# Patient Record
Sex: MALE | Race: WHITE | Employment: OTHER | URBAN - METROPOLITAN AREA
[De-identification: names, ages, dates, MRNs, and addresses within clinical notes are randomized per-mention and may not be internally consistent; named-entity substitution may affect disease eponyms.]

---

## 2017-01-01 ENCOUNTER — HOME CARE VISIT (OUTPATIENT)
Dept: HOSPICE | Facility: HOSPICE | Age: 82
End: 2017-01-01
Payer: MEDICARE

## 2017-01-01 ENCOUNTER — PATIENT OUTREACH (OUTPATIENT)
Dept: CASE MANAGEMENT | Age: 82
End: 2017-01-01

## 2017-01-01 ENCOUNTER — HOME CARE VISIT (OUTPATIENT)
Dept: SCHEDULING | Facility: HOME HEALTH | Age: 82
End: 2017-01-01
Payer: MEDICARE

## 2017-01-01 ENCOUNTER — HOSPICE ADMISSION (OUTPATIENT)
Dept: HOSPICE | Facility: HOSPICE | Age: 82
End: 2017-01-01
Payer: MEDICARE

## 2017-01-01 ENCOUNTER — TELEPHONE (OUTPATIENT)
Dept: OTHER | Age: 82
End: 2017-01-01

## 2017-01-01 ENCOUNTER — APPOINTMENT (OUTPATIENT)
Dept: MRI IMAGING | Age: 82
DRG: 064 | End: 2017-01-01
Attending: INTERNAL MEDICINE
Payer: MEDICARE

## 2017-01-01 ENCOUNTER — APPOINTMENT (OUTPATIENT)
Dept: ULTRASOUND IMAGING | Age: 82
DRG: 064 | End: 2017-01-01
Attending: HOSPITALIST
Payer: MEDICARE

## 2017-01-01 ENCOUNTER — DOCUMENTATION ONLY (OUTPATIENT)
Dept: OTHER | Age: 82
End: 2017-01-01

## 2017-01-01 ENCOUNTER — HOSPITAL ENCOUNTER (INPATIENT)
Age: 82
LOS: 3 days | Discharge: HOSPICE | DRG: 064 | End: 2017-03-22
Attending: EMERGENCY MEDICINE | Admitting: INTERNAL MEDICINE
Payer: MEDICARE

## 2017-01-01 ENCOUNTER — APPOINTMENT (OUTPATIENT)
Dept: CT IMAGING | Age: 82
DRG: 064 | End: 2017-01-01
Attending: EMERGENCY MEDICINE
Payer: MEDICARE

## 2017-01-01 ENCOUNTER — APPOINTMENT (OUTPATIENT)
Dept: ULTRASOUND IMAGING | Age: 82
DRG: 064 | End: 2017-01-01
Attending: INTERNAL MEDICINE
Payer: MEDICARE

## 2017-01-01 VITALS
BODY MASS INDEX: 23.54 KG/M2 | OXYGEN SATURATION: 94 % | WEIGHT: 150 LBS | HEART RATE: 86 BPM | TEMPERATURE: 97.3 F | RESPIRATION RATE: 18 BRPM | DIASTOLIC BLOOD PRESSURE: 99 MMHG | SYSTOLIC BLOOD PRESSURE: 158 MMHG | HEIGHT: 67 IN

## 2017-01-01 VITALS
SYSTOLIC BLOOD PRESSURE: 130 MMHG | DIASTOLIC BLOOD PRESSURE: 80 MMHG | TEMPERATURE: 97.8 F | RESPIRATION RATE: 18 BRPM | HEART RATE: 104 BPM

## 2017-01-01 VITALS
HEART RATE: 104 BPM | DIASTOLIC BLOOD PRESSURE: 84 MMHG | TEMPERATURE: 98.1 F | RESPIRATION RATE: 20 BRPM | SYSTOLIC BLOOD PRESSURE: 140 MMHG

## 2017-01-01 VITALS
RESPIRATION RATE: 20 BRPM | DIASTOLIC BLOOD PRESSURE: 86 MMHG | SYSTOLIC BLOOD PRESSURE: 136 MMHG | TEMPERATURE: 97.7 F | HEART RATE: 77 BPM

## 2017-01-01 VITALS
TEMPERATURE: 98 F | DIASTOLIC BLOOD PRESSURE: 56 MMHG | SYSTOLIC BLOOD PRESSURE: 86 MMHG | HEART RATE: 128 BPM | RESPIRATION RATE: 20 BRPM

## 2017-01-01 VITALS
DIASTOLIC BLOOD PRESSURE: 72 MMHG | RESPIRATION RATE: 16 BRPM | HEART RATE: 104 BPM | SYSTOLIC BLOOD PRESSURE: 118 MMHG | TEMPERATURE: 98.1 F

## 2017-01-01 VITALS
RESPIRATION RATE: 18 BRPM | SYSTOLIC BLOOD PRESSURE: 100 MMHG | TEMPERATURE: 98.2 F | DIASTOLIC BLOOD PRESSURE: 60 MMHG | HEART RATE: 112 BPM

## 2017-01-01 VITALS
TEMPERATURE: 97.8 F | SYSTOLIC BLOOD PRESSURE: 110 MMHG | RESPIRATION RATE: 20 BRPM | HEART RATE: 116 BPM | DIASTOLIC BLOOD PRESSURE: 70 MMHG

## 2017-01-01 VITALS — DIASTOLIC BLOOD PRESSURE: 70 MMHG | RESPIRATION RATE: 18 BRPM | HEART RATE: 116 BPM | SYSTOLIC BLOOD PRESSURE: 112 MMHG

## 2017-01-01 VITALS
HEART RATE: 104 BPM | DIASTOLIC BLOOD PRESSURE: 70 MMHG | SYSTOLIC BLOOD PRESSURE: 110 MMHG | RESPIRATION RATE: 20 BRPM | TEMPERATURE: 97.8 F

## 2017-01-01 VITALS
RESPIRATION RATE: 16 BRPM | TEMPERATURE: 97.8 F | DIASTOLIC BLOOD PRESSURE: 60 MMHG | HEART RATE: 100 BPM | SYSTOLIC BLOOD PRESSURE: 100 MMHG

## 2017-01-01 VITALS
TEMPERATURE: 97.1 F | RESPIRATION RATE: 16 BRPM | HEART RATE: 72 BPM | DIASTOLIC BLOOD PRESSURE: 62 MMHG | SYSTOLIC BLOOD PRESSURE: 100 MMHG

## 2017-01-01 VITALS
SYSTOLIC BLOOD PRESSURE: 110 MMHG | RESPIRATION RATE: 18 BRPM | DIASTOLIC BLOOD PRESSURE: 80 MMHG | TEMPERATURE: 97.8 F | HEART RATE: 112 BPM

## 2017-01-01 VITALS
HEART RATE: 90 BPM | RESPIRATION RATE: 16 BRPM | DIASTOLIC BLOOD PRESSURE: 85 MMHG | SYSTOLIC BLOOD PRESSURE: 145 MMHG | TEMPERATURE: 97.8 F

## 2017-01-01 VITALS
SYSTOLIC BLOOD PRESSURE: 80 MMHG | TEMPERATURE: 98 F | DIASTOLIC BLOOD PRESSURE: 47 MMHG | HEART RATE: 110 BPM | RESPIRATION RATE: 16 BRPM

## 2017-01-01 VITALS
DIASTOLIC BLOOD PRESSURE: 88 MMHG | HEART RATE: 96 BPM | SYSTOLIC BLOOD PRESSURE: 125 MMHG | RESPIRATION RATE: 20 BRPM | TEMPERATURE: 97.9 F

## 2017-01-01 VITALS — HEART RATE: 104 BPM | SYSTOLIC BLOOD PRESSURE: 114 MMHG | RESPIRATION RATE: 20 BRPM | DIASTOLIC BLOOD PRESSURE: 76 MMHG

## 2017-01-01 VITALS
RESPIRATION RATE: 28 BRPM | HEART RATE: 108 BPM | SYSTOLIC BLOOD PRESSURE: 61 MMHG | RESPIRATION RATE: 20 BRPM | DIASTOLIC BLOOD PRESSURE: 34 MMHG | TEMPERATURE: 97.4 F | TEMPERATURE: 98.4 F | DIASTOLIC BLOOD PRESSURE: 60 MMHG | SYSTOLIC BLOOD PRESSURE: 80 MMHG | HEART RATE: 128 BPM

## 2017-01-01 VITALS
HEART RATE: 90 BPM | SYSTOLIC BLOOD PRESSURE: 89 MMHG | RESPIRATION RATE: 12 BRPM | DIASTOLIC BLOOD PRESSURE: 57 MMHG | TEMPERATURE: 97.9 F

## 2017-01-01 VITALS — RESPIRATION RATE: 16 BRPM | DIASTOLIC BLOOD PRESSURE: 77 MMHG | SYSTOLIC BLOOD PRESSURE: 127 MMHG | HEART RATE: 99 BPM

## 2017-01-01 VITALS — SYSTOLIC BLOOD PRESSURE: 119 MMHG | HEART RATE: 66 BPM | DIASTOLIC BLOOD PRESSURE: 58 MMHG

## 2017-01-01 VITALS
TEMPERATURE: 98.5 F | RESPIRATION RATE: 18 BRPM | DIASTOLIC BLOOD PRESSURE: 67 MMHG | SYSTOLIC BLOOD PRESSURE: 146 MMHG | HEART RATE: 108 BPM

## 2017-01-01 VITALS
DIASTOLIC BLOOD PRESSURE: 70 MMHG | TEMPERATURE: 97.6 F | RESPIRATION RATE: 18 BRPM | HEART RATE: 108 BPM | SYSTOLIC BLOOD PRESSURE: 100 MMHG

## 2017-01-01 VITALS — RESPIRATION RATE: 20 BRPM | SYSTOLIC BLOOD PRESSURE: 140 MMHG | DIASTOLIC BLOOD PRESSURE: 90 MMHG | HEART RATE: 84 BPM

## 2017-01-01 DIAGNOSIS — G45.9 TRANSIENT CEREBRAL ISCHEMIA, UNSPECIFIED TYPE: Primary | ICD-10-CM

## 2017-01-01 LAB
ALBUMIN SERPL BCP-MCNC: 3.3 G/DL (ref 3.2–4.6)
ALBUMIN/GLOB SERPL: 0.8 {RATIO} (ref 1.2–3.5)
ALP SERPL-CCNC: 110 U/L (ref 50–136)
ALT SERPL-CCNC: 8 U/L (ref 12–65)
ANION GAP BLD CALC-SCNC: 10 MMOL/L (ref 7–16)
ANION GAP BLD CALC-SCNC: 11 MMOL/L (ref 7–16)
ANION GAP BLD CALC-SCNC: 5 MMOL/L (ref 7–16)
ANION GAP BLD CALC-SCNC: 5 MMOL/L (ref 7–16)
APPEARANCE UR: ABNORMAL
AST SERPL W P-5'-P-CCNC: 18 U/L (ref 15–37)
ATRIAL RATE: 105 BPM
BACTERIA SPEC CULT: ABNORMAL
BACTERIA URNS QL MICRO: ABNORMAL /HPF
BASOPHILS # BLD AUTO: 0 K/UL (ref 0–0.2)
BASOPHILS # BLD: 0 % (ref 0–2)
BILIRUB SERPL-MCNC: 0.6 MG/DL (ref 0.2–1.1)
BILIRUB UR QL: NEGATIVE
BUN SERPL-MCNC: 17 MG/DL (ref 8–23)
BUN SERPL-MCNC: 20 MG/DL (ref 8–23)
BUN SERPL-MCNC: 21 MG/DL (ref 8–23)
BUN SERPL-MCNC: 22 MG/DL (ref 8–23)
CALCIUM SERPL-MCNC: 8.6 MG/DL (ref 8.3–10.4)
CALCIUM SERPL-MCNC: 8.8 MG/DL (ref 8.3–10.4)
CALCIUM SERPL-MCNC: 8.9 MG/DL (ref 8.3–10.4)
CALCIUM SERPL-MCNC: 9 MG/DL (ref 8.3–10.4)
CALCULATED P AXIS, ECG09: 0 DEGREES
CALCULATED R AXIS, ECG10: -76 DEGREES
CALCULATED T AXIS, ECG11: 79 DEGREES
CASTS URNS QL MICRO: ABNORMAL /LPF
CHLORIDE SERPL-SCNC: 104 MMOL/L (ref 98–107)
CHLORIDE SERPL-SCNC: 105 MMOL/L (ref 98–107)
CHLORIDE SERPL-SCNC: 107 MMOL/L (ref 98–107)
CHLORIDE SERPL-SCNC: 107 MMOL/L (ref 98–107)
CHOLEST SERPL-MCNC: 152 MG/DL
CO2 SERPL-SCNC: 25 MMOL/L (ref 21–32)
CO2 SERPL-SCNC: 27 MMOL/L (ref 21–32)
CO2 SERPL-SCNC: 29 MMOL/L (ref 21–32)
CO2 SERPL-SCNC: 31 MMOL/L (ref 21–32)
COLOR UR: YELLOW
CREAT SERPL-MCNC: 1.06 MG/DL (ref 0.8–1.5)
CREAT SERPL-MCNC: 1.08 MG/DL (ref 0.8–1.5)
CREAT SERPL-MCNC: 1.11 MG/DL (ref 0.8–1.5)
CREAT SERPL-MCNC: 1.26 MG/DL (ref 0.8–1.5)
DIAGNOSIS, 93000: NORMAL
DIASTOLIC BP, ECG02: NORMAL MMHG
DIFFERENTIAL METHOD BLD: ABNORMAL
EOSINOPHIL # BLD: 0.2 K/UL (ref 0–0.8)
EOSINOPHIL NFR BLD: 3 % (ref 0.5–7.8)
ERYTHROCYTE [DISTWIDTH] IN BLOOD BY AUTOMATED COUNT: 15.4 % (ref 11.9–14.6)
ERYTHROCYTE [DISTWIDTH] IN BLOOD BY AUTOMATED COUNT: 15.4 % (ref 11.9–14.6)
ERYTHROCYTE [DISTWIDTH] IN BLOOD BY AUTOMATED COUNT: 15.5 % (ref 11.9–14.6)
ERYTHROCYTE [DISTWIDTH] IN BLOOD BY AUTOMATED COUNT: 15.6 % (ref 11.9–14.6)
EST. AVERAGE GLUCOSE BLD GHB EST-MCNC: 126 MG/DL
GLOBULIN SER CALC-MCNC: 4.2 G/DL (ref 2.3–3.5)
GLUCOSE BLD STRIP.AUTO-MCNC: 100 MG/DL (ref 65–100)
GLUCOSE BLD STRIP.AUTO-MCNC: 106 MG/DL (ref 65–100)
GLUCOSE BLD STRIP.AUTO-MCNC: 111 MG/DL (ref 65–100)
GLUCOSE BLD STRIP.AUTO-MCNC: 121 MG/DL (ref 65–100)
GLUCOSE BLD STRIP.AUTO-MCNC: 134 MG/DL (ref 65–100)
GLUCOSE BLD STRIP.AUTO-MCNC: 82 MG/DL (ref 65–100)
GLUCOSE BLD STRIP.AUTO-MCNC: 83 MG/DL (ref 65–100)
GLUCOSE BLD STRIP.AUTO-MCNC: 86 MG/DL (ref 65–100)
GLUCOSE BLD STRIP.AUTO-MCNC: 89 MG/DL (ref 65–100)
GLUCOSE BLD STRIP.AUTO-MCNC: 93 MG/DL (ref 65–100)
GLUCOSE BLD STRIP.AUTO-MCNC: 93 MG/DL (ref 65–100)
GLUCOSE SERPL-MCNC: 107 MG/DL (ref 65–100)
GLUCOSE SERPL-MCNC: 76 MG/DL (ref 65–100)
GLUCOSE SERPL-MCNC: 84 MG/DL (ref 65–100)
GLUCOSE SERPL-MCNC: 86 MG/DL (ref 65–100)
GLUCOSE UR STRIP.AUTO-MCNC: NEGATIVE MG/DL
HBA1C MFR BLD: 6 % (ref 4.8–6)
HCT VFR BLD AUTO: 36.8 % (ref 41.1–50.3)
HCT VFR BLD AUTO: 37.3 % (ref 41.1–50.3)
HCT VFR BLD AUTO: 37.7 % (ref 41.1–50.3)
HCT VFR BLD AUTO: 41 % (ref 41.1–50.3)
HDLC SERPL-MCNC: 58 MG/DL (ref 40–60)
HDLC SERPL: 2.6 {RATIO}
HGB BLD-MCNC: 11.8 G/DL (ref 13.6–17.2)
HGB BLD-MCNC: 11.8 G/DL (ref 13.6–17.2)
HGB BLD-MCNC: 12.2 G/DL (ref 13.6–17.2)
HGB BLD-MCNC: 13.4 G/DL (ref 13.6–17.2)
HGB UR QL STRIP: NEGATIVE
IMM GRANULOCYTES # BLD: 0 K/UL (ref 0–0.5)
IMM GRANULOCYTES NFR BLD AUTO: 0.1 % (ref 0–5)
INR BLD: 1.1 (ref 0.9–1.2)
INR PPP: 1.1 (ref 0.9–1.2)
KETONES UR QL STRIP.AUTO: ABNORMAL MG/DL
LDLC SERPL CALC-MCNC: 83.4 MG/DL
LEUKOCYTE ESTERASE UR QL STRIP.AUTO: ABNORMAL
LIPID PROFILE,FLP: NORMAL
LYMPHOCYTES # BLD AUTO: 23 % (ref 13–44)
LYMPHOCYTES # BLD: 1.6 K/UL (ref 0.5–4.6)
MCH RBC QN AUTO: 27.6 PG (ref 26.1–32.9)
MCH RBC QN AUTO: 27.6 PG (ref 26.1–32.9)
MCH RBC QN AUTO: 28 PG (ref 26.1–32.9)
MCH RBC QN AUTO: 28.5 PG (ref 26.1–32.9)
MCHC RBC AUTO-ENTMCNC: 31.6 G/DL (ref 31.4–35)
MCHC RBC AUTO-ENTMCNC: 32.1 G/DL (ref 31.4–35)
MCHC RBC AUTO-ENTMCNC: 32.4 G/DL (ref 31.4–35)
MCHC RBC AUTO-ENTMCNC: 32.7 G/DL (ref 31.4–35)
MCV RBC AUTO: 86 FL (ref 79.6–97.8)
MCV RBC AUTO: 86.7 FL (ref 79.6–97.8)
MCV RBC AUTO: 87.2 FL (ref 79.6–97.8)
MCV RBC AUTO: 87.4 FL (ref 79.6–97.8)
MM INDURATION POC: NORMAL MM (ref 0–5)
MONOCYTES # BLD: 0.6 K/UL (ref 0.1–1.3)
MONOCYTES NFR BLD AUTO: 9 % (ref 4–12)
NEUTS SEG # BLD: 4.6 K/UL (ref 1.7–8.2)
NEUTS SEG NFR BLD AUTO: 65 % (ref 43–78)
NITRITE UR QL STRIP.AUTO: NEGATIVE
P-R INTERVAL, ECG05: 168 MS
PH UR STRIP: 6 [PH] (ref 5–9)
PLATELET # BLD AUTO: 248 K/UL (ref 150–450)
PLATELET # BLD AUTO: 249 K/UL (ref 150–450)
PLATELET # BLD AUTO: 249 K/UL (ref 150–450)
PLATELET # BLD AUTO: 265 K/UL (ref 150–450)
PMV BLD AUTO: 10 FL (ref 10.8–14.1)
PMV BLD AUTO: 10 FL (ref 10.8–14.1)
PMV BLD AUTO: 10.1 FL (ref 10.8–14.1)
PMV BLD AUTO: 10.3 FL (ref 10.8–14.1)
POTASSIUM SERPL-SCNC: 3.4 MMOL/L (ref 3.5–5.1)
POTASSIUM SERPL-SCNC: 3.6 MMOL/L (ref 3.5–5.1)
POTASSIUM SERPL-SCNC: 3.8 MMOL/L (ref 3.5–5.1)
POTASSIUM SERPL-SCNC: 4.6 MMOL/L (ref 3.5–5.1)
PPD POC: NORMAL NEGATIVE
PROT SERPL-MCNC: 7.5 G/DL (ref 6.3–8.2)
PROT UR STRIP-MCNC: NEGATIVE MG/DL
PROTHROMBIN TIME: 12.1 SEC (ref 9.6–12)
PT BLD: 13.6 SECS (ref 9.6–11.6)
Q-T INTERVAL, ECG07: 352 MS
QRS DURATION, ECG06: 110 MS
QTC CALCULATION (BEZET), ECG08: 462 MS
RBC # BLD AUTO: 4.27 M/UL (ref 4.23–5.67)
RBC # BLD AUTO: 4.28 M/UL (ref 4.23–5.67)
RBC # BLD AUTO: 4.35 M/UL (ref 4.23–5.67)
RBC # BLD AUTO: 4.7 M/UL (ref 4.23–5.67)
RBC #/AREA URNS HPF: ABNORMAL /HPF
SERVICE CMNT-IMP: ABNORMAL
SERVICE CMNT-IMP: ABNORMAL
SODIUM SERPL-SCNC: 139 MMOL/L (ref 136–145)
SODIUM SERPL-SCNC: 141 MMOL/L (ref 136–145)
SODIUM SERPL-SCNC: 143 MMOL/L (ref 136–145)
SODIUM SERPL-SCNC: 143 MMOL/L (ref 136–145)
SP GR UR REFRACTOMETRY: 1.02 (ref 1–1.02)
SYSTOLIC BP, ECG01: NORMAL MMHG
TRIGL SERPL-MCNC: 53 MG/DL (ref 35–150)
UROBILINOGEN UR QL STRIP.AUTO: 0.2 EU/DL (ref 0.2–1)
VENTRICULAR RATE, ECG03: 104 BPM
VLDLC SERPL CALC-MCNC: 10.6 MG/DL (ref 6–23)
WBC # BLD AUTO: 5.6 K/UL (ref 4.3–11.1)
WBC # BLD AUTO: 6.2 K/UL (ref 4.3–11.1)
WBC # BLD AUTO: 7 K/UL (ref 4.3–11.1)
WBC # BLD AUTO: 7 K/UL (ref 4.3–11.1)
WBC URNS QL MICRO: ABNORMAL /HPF

## 2017-01-01 PROCEDURE — 0651 HSPC ROUTINE HOME CARE

## 2017-01-01 PROCEDURE — G0299 HHS/HOSPICE OF RN EA 15 MIN: HCPCS

## 2017-01-01 PROCEDURE — 87086 URINE CULTURE/COLONY COUNT: CPT | Performed by: INTERNAL MEDICINE

## 2017-01-01 PROCEDURE — G0156 HHCP-SVS OF AIDE,EA 15 MIN: HCPCS

## 2017-01-01 PROCEDURE — 76770 US EXAM ABDO BACK WALL COMP: CPT

## 2017-01-01 PROCEDURE — 85027 COMPLETE CBC AUTOMATED: CPT | Performed by: INTERNAL MEDICINE

## 2017-01-01 PROCEDURE — 74011250637 HC RX REV CODE- 250/637: Performed by: INTERNAL MEDICINE

## 2017-01-01 PROCEDURE — G8996 SWALLOW CURRENT STATUS: HCPCS

## 2017-01-01 PROCEDURE — C8929 TTE W OR WO FOL WCON,DOPPLER: HCPCS

## 2017-01-01 PROCEDURE — 74011250636 HC RX REV CODE- 250/636: Performed by: INTERNAL MEDICINE

## 2017-01-01 PROCEDURE — 74011000302 HC RX REV CODE- 302: Performed by: INTERNAL MEDICINE

## 2017-01-01 PROCEDURE — HOSPICE MEDICATION HC HH HOSPICE MEDICATION

## 2017-01-01 PROCEDURE — 74011250637 HC RX REV CODE- 250/637: Performed by: HOSPITALIST

## 2017-01-01 PROCEDURE — 51798 US URINE CAPACITY MEASURE: CPT

## 2017-01-01 PROCEDURE — 99218 HC RM OBSERVATION: CPT

## 2017-01-01 PROCEDURE — 65660000000 HC RM CCU STEPDOWN

## 2017-01-01 PROCEDURE — 74011000250 HC RX REV CODE- 250: Performed by: INTERNAL MEDICINE

## 2017-01-01 PROCEDURE — 82962 GLUCOSE BLOOD TEST: CPT

## 2017-01-01 PROCEDURE — 36415 COLL VENOUS BLD VENIPUNCTURE: CPT | Performed by: INTERNAL MEDICINE

## 2017-01-01 PROCEDURE — BT40ZZZ ULTRASONOGRAPHY OF BLADDER: ICD-10-PCS | Performed by: INTERNAL MEDICINE

## 2017-01-01 PROCEDURE — 80053 COMPREHEN METABOLIC PANEL: CPT | Performed by: EMERGENCY MEDICINE

## 2017-01-01 PROCEDURE — G8997 SWALLOW GOAL STATUS: HCPCS

## 2017-01-01 PROCEDURE — G0155 HHCP-SVS OF CSW,EA 15 MIN: HCPCS

## 2017-01-01 PROCEDURE — 97166 OT EVAL MOD COMPLEX 45 MIN: CPT

## 2017-01-01 PROCEDURE — 87088 URINE BACTERIA CULTURE: CPT | Performed by: INTERNAL MEDICINE

## 2017-01-01 PROCEDURE — 85025 COMPLETE CBC W/AUTO DIFF WBC: CPT | Performed by: EMERGENCY MEDICINE

## 2017-01-01 PROCEDURE — 93880 EXTRACRANIAL BILAT STUDY: CPT

## 2017-01-01 PROCEDURE — T4541 LARGE DISPOSABLE UNDERPAD: HCPCS

## 2017-01-01 PROCEDURE — 87186 SC STD MICRODIL/AGAR DIL: CPT | Performed by: INTERNAL MEDICINE

## 2017-01-01 PROCEDURE — 74011000258 HC RX REV CODE- 258: Performed by: INTERNAL MEDICINE

## 2017-01-01 PROCEDURE — 85610 PROTHROMBIN TIME: CPT

## 2017-01-01 PROCEDURE — 85610 PROTHROMBIN TIME: CPT | Performed by: EMERGENCY MEDICINE

## 2017-01-01 PROCEDURE — T4523 ADULT SIZE BRIEF/DIAPER LG: HCPCS

## 2017-01-01 PROCEDURE — 77030011943

## 2017-01-01 PROCEDURE — 80048 BASIC METABOLIC PNL TOTAL CA: CPT | Performed by: INTERNAL MEDICINE

## 2017-01-01 PROCEDURE — 97167 OT EVAL HIGH COMPLEX 60 MIN: CPT

## 2017-01-01 PROCEDURE — 85027 COMPLETE CBC AUTOMATED: CPT | Performed by: HOSPITALIST

## 2017-01-01 PROCEDURE — G8987 SELF CARE CURRENT STATUS: HCPCS

## 2017-01-01 PROCEDURE — 99285 EMERGENCY DEPT VISIT HI MDM: CPT | Performed by: EMERGENCY MEDICINE

## 2017-01-01 PROCEDURE — 93005 ELECTROCARDIOGRAM TRACING: CPT | Performed by: EMERGENCY MEDICINE

## 2017-01-01 PROCEDURE — 80061 LIPID PANEL: CPT | Performed by: INTERNAL MEDICINE

## 2017-01-01 PROCEDURE — 87641 MR-STAPH DNA AMP PROBE: CPT | Performed by: INTERNAL MEDICINE

## 2017-01-01 PROCEDURE — 70551 MRI BRAIN STEM W/O DYE: CPT

## 2017-01-01 PROCEDURE — 86580 TB INTRADERMAL TEST: CPT | Performed by: INTERNAL MEDICINE

## 2017-01-01 PROCEDURE — 77010033678 HC OXYGEN DAILY

## 2017-01-01 PROCEDURE — 92610 EVALUATE SWALLOWING FUNCTION: CPT

## 2017-01-01 PROCEDURE — 83036 HEMOGLOBIN GLYCOSYLATED A1C: CPT | Performed by: INTERNAL MEDICINE

## 2017-01-01 PROCEDURE — 94760 N-INVAS EAR/PLS OXIMETRY 1: CPT

## 2017-01-01 PROCEDURE — 97161 PT EVAL LOW COMPLEX 20 MIN: CPT

## 2017-01-01 PROCEDURE — 70450 CT HEAD/BRAIN W/O DYE: CPT

## 2017-01-01 PROCEDURE — 65270000029 HC RM PRIVATE

## 2017-01-01 PROCEDURE — 3336500001 HSPC ELECTION

## 2017-01-01 PROCEDURE — 80048 BASIC METABOLIC PNL TOTAL CA: CPT | Performed by: HOSPITALIST

## 2017-01-01 PROCEDURE — 81003 URINALYSIS AUTO W/O SCOPE: CPT | Performed by: INTERNAL MEDICINE

## 2017-01-01 PROCEDURE — 36415 COLL VENOUS BLD VENIPUNCTURE: CPT | Performed by: HOSPITALIST

## 2017-01-01 PROCEDURE — G8988 SELF CARE GOAL STATUS: HCPCS

## 2017-01-01 RX ORDER — FINASTERIDE 5 MG/1
5 TABLET, FILM COATED ORAL DAILY
Status: DISCONTINUED | OUTPATIENT
Start: 2017-01-01 | End: 2017-01-01 | Stop reason: HOSPADM

## 2017-01-01 RX ORDER — TAMSULOSIN HYDROCHLORIDE 0.4 MG/1
0.4 CAPSULE ORAL DAILY
Status: DISCONTINUED | OUTPATIENT
Start: 2017-01-01 | End: 2017-01-01 | Stop reason: HOSPADM

## 2017-01-01 RX ORDER — SODIUM CHLORIDE 0.9 % (FLUSH) 0.9 %
5-10 SYRINGE (ML) INJECTION EVERY 8 HOURS
Status: DISCONTINUED | OUTPATIENT
Start: 2017-01-01 | End: 2017-01-01 | Stop reason: HOSPADM

## 2017-01-01 RX ORDER — MELATONIN
2000 2 TIMES DAILY
Status: DISCONTINUED | OUTPATIENT
Start: 2017-01-01 | End: 2017-01-01 | Stop reason: HOSPADM

## 2017-01-01 RX ORDER — FUROSEMIDE 20 MG/1
20 TABLET ORAL DAILY
Status: DISCONTINUED | OUTPATIENT
Start: 2017-01-01 | End: 2017-01-01 | Stop reason: HOSPADM

## 2017-01-01 RX ORDER — DIVALPROEX SODIUM 125 MG/1
125 CAPSULE, COATED PELLETS ORAL
Status: DISCONTINUED | OUTPATIENT
Start: 2017-01-01 | End: 2017-01-01 | Stop reason: HOSPADM

## 2017-01-01 RX ORDER — VANCOMYCIN HYDROCHLORIDE
1250 EVERY 24 HOURS
Status: DISCONTINUED | OUTPATIENT
Start: 2017-01-01 | End: 2017-01-01 | Stop reason: HOSPADM

## 2017-01-01 RX ORDER — GUAIFENESIN 100 MG/5ML
81 LIQUID (ML) ORAL DAILY
Status: DISCONTINUED | OUTPATIENT
Start: 2017-01-01 | End: 2017-01-01 | Stop reason: HOSPADM

## 2017-01-01 RX ORDER — DIVALPROEX SODIUM 125 MG/1
125 CAPSULE, COATED PELLETS ORAL
Qty: 90 CAP | Refills: 2 | Status: SHIPPED
Start: 2017-01-01 | End: 2017-01-01

## 2017-01-01 RX ORDER — HEPARIN SODIUM 5000 [USP'U]/ML
5000 INJECTION, SOLUTION INTRAVENOUS; SUBCUTANEOUS EVERY 8 HOURS
Status: DISCONTINUED | OUTPATIENT
Start: 2017-01-01 | End: 2017-01-01 | Stop reason: HOSPADM

## 2017-01-01 RX ORDER — ATORVASTATIN CALCIUM 40 MG/1
40 TABLET, FILM COATED ORAL
Status: DISCONTINUED | OUTPATIENT
Start: 2017-01-01 | End: 2017-01-01 | Stop reason: HOSPADM

## 2017-01-01 RX ORDER — SODIUM CHLORIDE 9 MG/ML
75 INJECTION, SOLUTION INTRAVENOUS CONTINUOUS
Status: DISCONTINUED | OUTPATIENT
Start: 2017-01-01 | End: 2017-01-01 | Stop reason: HOSPADM

## 2017-01-01 RX ORDER — VANCOMYCIN/0.9 % SOD CHLORIDE 1.5G/250ML
1500 PLASTIC BAG, INJECTION (ML) INTRAVENOUS EVERY 12 HOURS
Status: DISCONTINUED | OUTPATIENT
Start: 2017-01-01 | End: 2017-01-01 | Stop reason: DRUGHIGH

## 2017-01-01 RX ORDER — LISINOPRIL 5 MG/1
2.5 TABLET ORAL DAILY
Status: DISCONTINUED | OUTPATIENT
Start: 2017-01-01 | End: 2017-01-01 | Stop reason: HOSPADM

## 2017-01-01 RX ORDER — SODIUM CHLORIDE 0.9 % (FLUSH) 0.9 %
5-10 SYRINGE (ML) INJECTION AS NEEDED
Status: DISCONTINUED | OUTPATIENT
Start: 2017-01-01 | End: 2017-01-01 | Stop reason: HOSPADM

## 2017-01-01 RX ORDER — CARBIDOPA AND LEVODOPA 25; 100 MG/1; MG/1
1.5 TABLET ORAL 4 TIMES DAILY
Status: DISCONTINUED | OUTPATIENT
Start: 2017-01-01 | End: 2017-01-01 | Stop reason: HOSPADM

## 2017-01-01 RX ADMIN — CARBIDOPA AND LEVODOPA 1.5 TABLET: 25; 100 TABLET ORAL at 16:53

## 2017-01-01 RX ADMIN — SODIUM CHLORIDE 75 ML/HR: 900 INJECTION, SOLUTION INTRAVENOUS at 03:31

## 2017-01-01 RX ADMIN — FUROSEMIDE 20 MG: 20 TABLET ORAL at 14:59

## 2017-01-01 RX ADMIN — Medication 10 ML: at 23:18

## 2017-01-01 RX ADMIN — HEPARIN SODIUM 5000 UNITS: 5000 INJECTION, SOLUTION INTRAVENOUS; SUBCUTANEOUS at 13:37

## 2017-01-01 RX ADMIN — ATORVASTATIN CALCIUM 40 MG: 40 TABLET, FILM COATED ORAL at 22:27

## 2017-01-01 RX ADMIN — VANCOMYCIN HYDROCHLORIDE 1250 MG: 10 INJECTION, POWDER, LYOPHILIZED, FOR SOLUTION INTRAVENOUS at 10:00

## 2017-01-01 RX ADMIN — Medication 10 ML: at 20:51

## 2017-01-01 RX ADMIN — PERFLUTREN 1 ML: 6.52 INJECTION, SUSPENSION INTRAVENOUS at 09:00

## 2017-01-01 RX ADMIN — TUBERCULIN PURIFIED PROTEIN DERIVATIVE 5 UNITS: 5 INJECTION INTRADERMAL at 16:47

## 2017-01-01 RX ADMIN — HEPARIN SODIUM 5000 UNITS: 5000 INJECTION, SOLUTION INTRAVENOUS; SUBCUTANEOUS at 20:49

## 2017-01-01 RX ADMIN — SODIUM CHLORIDE 75 ML/HR: 900 INJECTION, SOLUTION INTRAVENOUS at 18:35

## 2017-01-01 RX ADMIN — CARBIDOPA AND LEVODOPA 1.5 TABLET: 25; 100 TABLET ORAL at 08:21

## 2017-01-01 RX ADMIN — HEPARIN SODIUM 5000 UNITS: 5000 INJECTION, SOLUTION INTRAVENOUS; SUBCUTANEOUS at 06:35

## 2017-01-01 RX ADMIN — HEPARIN SODIUM 5000 UNITS: 5000 INJECTION, SOLUTION INTRAVENOUS; SUBCUTANEOUS at 13:31

## 2017-01-01 RX ADMIN — Medication 10 ML: at 05:35

## 2017-01-01 RX ADMIN — Medication 10 ML: at 06:36

## 2017-01-01 RX ADMIN — LISINOPRIL 2.5 MG: 5 TABLET ORAL at 13:31

## 2017-01-01 RX ADMIN — Medication 5 ML: at 14:00

## 2017-01-01 RX ADMIN — FUROSEMIDE 20 MG: 20 TABLET ORAL at 08:22

## 2017-01-01 RX ADMIN — TAMSULOSIN HYDROCHLORIDE 0.4 MG: 0.4 CAPSULE ORAL at 11:55

## 2017-01-01 RX ADMIN — ASPIRIN 81 MG: 81 TABLET, CHEWABLE ORAL at 11:55

## 2017-01-01 RX ADMIN — FUROSEMIDE 20 MG: 20 TABLET ORAL at 11:55

## 2017-01-01 RX ADMIN — CARBIDOPA AND LEVODOPA 1.5 TABLET: 25; 100 TABLET ORAL at 09:03

## 2017-01-01 RX ADMIN — VITAMIN D, TAB 1000IU (100/BT) 2000 UNITS: 25 TAB at 11:56

## 2017-01-01 RX ADMIN — CARBIDOPA AND LEVODOPA 1.5 TABLET: 25; 100 TABLET ORAL at 17:10

## 2017-01-01 RX ADMIN — VITAMIN D, TAB 1000IU (100/BT) 2000 UNITS: 25 TAB at 17:10

## 2017-01-01 RX ADMIN — HEPARIN SODIUM 5000 UNITS: 5000 INJECTION, SOLUTION INTRAVENOUS; SUBCUTANEOUS at 23:18

## 2017-01-01 RX ADMIN — TAMSULOSIN HYDROCHLORIDE 0.4 MG: 0.4 CAPSULE ORAL at 09:03

## 2017-01-01 RX ADMIN — Medication 10 ML: at 05:08

## 2017-01-01 RX ADMIN — FINASTERIDE 5 MG: 5 TABLET, FILM COATED ORAL at 08:22

## 2017-01-01 RX ADMIN — CEFTRIAXONE 1 G: 1 INJECTION, POWDER, FOR SOLUTION INTRAMUSCULAR; INTRAVENOUS at 19:33

## 2017-01-01 RX ADMIN — VANCOMYCIN HYDROCHLORIDE 1250 MG: 10 INJECTION, POWDER, LYOPHILIZED, FOR SOLUTION INTRAVENOUS at 09:02

## 2017-01-01 RX ADMIN — CARBIDOPA AND LEVODOPA 1.5 TABLET: 25; 100 TABLET ORAL at 22:28

## 2017-01-01 RX ADMIN — FINASTERIDE 5 MG: 5 TABLET, FILM COATED ORAL at 09:03

## 2017-01-01 RX ADMIN — TAMSULOSIN HYDROCHLORIDE 0.4 MG: 0.4 CAPSULE ORAL at 14:59

## 2017-01-01 RX ADMIN — ASPIRIN 81 MG: 81 TABLET, CHEWABLE ORAL at 14:59

## 2017-01-01 RX ADMIN — VITAMIN D, TAB 1000IU (100/BT) 2000 UNITS: 25 TAB at 19:32

## 2017-01-01 RX ADMIN — ATORVASTATIN CALCIUM 40 MG: 40 TABLET, FILM COATED ORAL at 21:14

## 2017-01-01 RX ADMIN — CARBIDOPA AND LEVODOPA 1.5 TABLET: 25; 100 TABLET ORAL at 20:49

## 2017-01-01 RX ADMIN — HEPARIN SODIUM 5000 UNITS: 5000 INJECTION, SOLUTION INTRAVENOUS; SUBCUTANEOUS at 15:07

## 2017-01-01 RX ADMIN — HEPARIN SODIUM 5000 UNITS: 5000 INJECTION, SOLUTION INTRAVENOUS; SUBCUTANEOUS at 21:14

## 2017-01-01 RX ADMIN — TAMSULOSIN HYDROCHLORIDE 0.4 MG: 0.4 CAPSULE ORAL at 08:22

## 2017-01-01 RX ADMIN — CARBIDOPA AND LEVODOPA 1.5 TABLET: 25; 100 TABLET ORAL at 14:59

## 2017-01-01 RX ADMIN — FUROSEMIDE 20 MG: 20 TABLET ORAL at 09:03

## 2017-01-01 RX ADMIN — SODIUM CHLORIDE 75 ML/HR: 900 INJECTION, SOLUTION INTRAVENOUS at 07:56

## 2017-01-01 RX ADMIN — CARBIDOPA AND LEVODOPA 1.5 TABLET: 25; 100 TABLET ORAL at 11:55

## 2017-01-01 RX ADMIN — Medication 10 ML: at 22:31

## 2017-01-01 RX ADMIN — VITAMIN D, TAB 1000IU (100/BT) 2000 UNITS: 25 TAB at 16:53

## 2017-01-01 RX ADMIN — ASPIRIN 81 MG: 81 TABLET, CHEWABLE ORAL at 09:03

## 2017-01-01 RX ADMIN — HEPARIN SODIUM 5000 UNITS: 5000 INJECTION, SOLUTION INTRAVENOUS; SUBCUTANEOUS at 06:00

## 2017-01-01 RX ADMIN — HEPARIN SODIUM 5000 UNITS: 5000 INJECTION, SOLUTION INTRAVENOUS; SUBCUTANEOUS at 05:08

## 2017-01-01 RX ADMIN — ATORVASTATIN CALCIUM 40 MG: 40 TABLET, FILM COATED ORAL at 20:50

## 2017-01-01 RX ADMIN — VITAMIN D, TAB 1000IU (100/BT) 2000 UNITS: 25 TAB at 08:22

## 2017-01-01 RX ADMIN — CARBIDOPA AND LEVODOPA 1.5 TABLET: 25; 100 TABLET ORAL at 13:31

## 2017-01-01 RX ADMIN — HEPARIN SODIUM 5000 UNITS: 5000 INJECTION, SOLUTION INTRAVENOUS; SUBCUTANEOUS at 22:00

## 2017-01-01 RX ADMIN — CARBIDOPA AND LEVODOPA 1.5 TABLET: 25; 100 TABLET ORAL at 19:32

## 2017-01-01 RX ADMIN — ASPIRIN 81 MG: 81 TABLET, CHEWABLE ORAL at 08:21

## 2017-01-01 RX ADMIN — HEPARIN SODIUM 5000 UNITS: 5000 INJECTION, SOLUTION INTRAVENOUS; SUBCUTANEOUS at 05:35

## 2017-01-01 RX ADMIN — Medication 5 ML: at 06:00

## 2017-01-01 RX ADMIN — CARBIDOPA AND LEVODOPA 1.5 TABLET: 25; 100 TABLET ORAL at 21:14

## 2017-01-01 RX ADMIN — VITAMIN D, TAB 1000IU (100/BT) 2000 UNITS: 25 TAB at 09:03

## 2017-01-01 RX ADMIN — CARBIDOPA AND LEVODOPA 1.5 TABLET: 25; 100 TABLET ORAL at 13:37

## 2017-01-01 RX ADMIN — FINASTERIDE 5 MG: 5 TABLET, FILM COATED ORAL at 11:55

## 2017-01-01 RX ADMIN — FINASTERIDE 5 MG: 5 TABLET, FILM COATED ORAL at 14:59

## 2017-01-01 RX ADMIN — LISINOPRIL 2.5 MG: 5 TABLET ORAL at 11:56

## 2017-01-01 RX ADMIN — Medication 10 ML: at 21:14

## 2017-03-18 NOTE — ED PROVIDER NOTES
HPI Comments: Patient presents to the ER via EMS after family noted acute onset of right-sided weakness. Apparently patient has a history of dementia and doesn't speak much as well as has difficulty walking around. However family knows and around 1:30 PM he had acute onset of gazing towards his left side and right-sided weakness. EMS was called and patient was brought to the ER. Patient is a 80 y.o. male presenting with weakness. The history is provided by the EMS personnel. Extremity Weakness    This is a new problem. The current episode started 1 to 2 hours ago. The problem occurs constantly. The problem has not changed since onset. Pertinent negatives include no back pain. Past Medical History:   Diagnosis Date    BPH (benign prostatic hyperplasia) 2/2/2014    Cancer of the skin, basal cell     Dizziness 2/2/2014    Elevated PSA     GERD (gastroesophageal reflux disease)     IFG (impaired fasting glucose)     Lumbosacral spondylosis without myelopathy     OA (osteoarthritis)     Other ill-defined conditions(799.89)     Diverticula - BE 1999    Parkinson disease ([facility] [address]     Pure hypercholesterolemia     Sciatica     Spinal stenosis, lumbar region, without neurogenic claudication     TIA (transient ischemic attack) 2/2/2014    Unspecified hyperplasia of prostate without urinary obstruction and other lower urinary tract symptoms (LUTS)        Past Surgical History:   Procedure Laterality Date    HX CYST REMOVAL      Chest wall cyst removed    HX HERNIA REPAIR Bilateral     Inguinal         Family History:   Problem Relation Age of Onset    Alzheimer Mother     Other Father      Bowel obstruction    Stroke Brother     Heart Attack Brother        Social History     Social History    Marital status:      Spouse name: N/A    Number of children: N/A    Years of education: N/A     Occupational History    Not on file.      Social History Main Topics    Smoking status: Former Smoker    Smokeless tobacco: Not on file    Alcohol use No      Comment: 1 x per week    Drug use: Not on file    Sexual activity: Not on file     Other Topics Concern    Not on file     Social History Narrative         ALLERGIES: Review of patient's allergies indicates no known allergies. Review of Systems   Constitutional: Negative for fatigue, fever and unexpected weight change. Eyes: Negative for photophobia, redness and visual disturbance. Cardiovascular: Negative for palpitations and leg swelling. Gastrointestinal: Negative for nausea and vomiting. Endocrine: Negative for polydipsia, polyphagia and polyuria. Genitourinary: Negative for urgency. Musculoskeletal: Negative for back pain and gait problem. Skin: Negative for pallor and rash. Allergic/Immunologic: Negative for food allergies and immunocompromised state. Neurological: Positive for weakness. Psychiatric/Behavioral: Negative for behavioral problems. All other systems reviewed and are negative. Vitals:    03/18/17 1533   BP: 128/65   Pulse: (!) 102   Resp: 16   SpO2: 96%   Weight: 68 kg (150 lb)   Height: 5' 7\" (1.702 m)            Physical Exam   Constitutional: He appears well-developed and well-nourished. HENT:   Head: Normocephalic and atraumatic. Mouth/Throat: Oropharynx is clear and moist. No oropharyngeal exudate. Eyes: Conjunctivae and EOM are normal. Pupils are equal, round, and reactive to light. Neck: Normal range of motion. Neck supple. Cardiovascular: Normal rate and regular rhythm. Pulmonary/Chest: Effort normal and breath sounds normal.   Abdominal: Soft. Bowel sounds are normal.   Neurological: He is alert. Patient with lateralize left gaze, right upper and right lower extremity weakness noted   Skin: Skin is warm and dry. Nursing note and vitals reviewed.        MDM  Number of Diagnoses or Management Options  Diagnosis management comments: Concern for acute CVA, post stroke has been called  Neurology has been consulted    3:56 PM  Patient has had improvement of symptoms. He is able to follow commands and gaze is not fixed. He has been seen by neurology who at this time did not feel TPA is warranted. We'll admit to the hospital for further CVA workup       Amount and/or Complexity of Data Reviewed  Clinical lab tests: ordered and reviewed  Tests in the radiology section of CPT®: reviewed and ordered    Risk of Complications, Morbidity, and/or Mortality  Presenting problems: high  Diagnostic procedures: moderate  Management options: high    Patient Progress  Patient progress: stable    ED Course       Procedures    Results Include:    Recent Results (from the past 24 hour(s))   POC PT/INR    Collection Time: 03/18/17  3:31 PM   Result Value Ref Range    Prothrombin time (POC) 13.6 (H) 9.6 - 11.6 SECS    INR (POC) 1.1 0.9 - 1.2     CBC WITH AUTOMATED DIFF    Collection Time: 03/18/17  3:38 PM   Result Value Ref Range    WBC 7.0 4.3 - 11.1 K/uL    RBC 4.70 4.23 - 5.67 M/uL    HGB 13.4 (L) 13.6 - 17.2 g/dL    HCT 41.0 (L) 41.1 - 50.3 %    MCV 87.2 79.6 - 97.8 FL    MCH 28.5 26.1 - 32.9 PG    MCHC 32.7 31.4 - 35.0 g/dL    RDW 15.4 (H) 11.9 - 14.6 %    PLATELET 950 502 - 273 K/uL    MPV 10.0 (L) 10.8 - 14.1 FL    DF AUTOMATED      NEUTROPHILS 65 43 - 78 %    LYMPHOCYTES 23 13 - 44 %    MONOCYTES 9 4.0 - 12.0 %    EOSINOPHILS 3 0.5 - 7.8 %    BASOPHILS 0 0.0 - 2.0 %    IMMATURE GRANULOCYTES 0.1 0.0 - 5.0 %    ABS. NEUTROPHILS 4.6 1.7 - 8.2 K/UL    ABS. LYMPHOCYTES 1.6 0.5 - 4.6 K/UL    ABS. MONOCYTES 0.6 0.1 - 1.3 K/UL    ABS. EOSINOPHILS 0.2 0.0 - 0.8 K/UL    ABS. BASOPHILS 0.0 0.0 - 0.2 K/UL    ABS. IMM.  GRANS. 0.0 0.0 - 0.5 K/UL   GLUCOSE, POC    Collection Time: 03/18/17  3:40 PM   Result Value Ref Range    Glucose (POC) 106 (H) 65 - 100 mg/dL     CT HEAD WO CONT (Final result) Result time: 03/18/17 15:30:58     Final result by Paramjit Faulkner MD (03/18/17 15:30:58)     Impression:     IMPRESSION:  Negative for acute intracranial abnormality.  Chronic changes.         Narrative:     CT HEAD WITHOUT CONTRAST. INDICATION: Acute right-sided weakness. CVA suspected. Codes stroke. COMPARISON: 23 April 2016.      TECHNIQUE:   5 mm axial scans from the skull base to the vertex.  Our CT  scanners use one or more of the following:  Automated exposure control,  adjustment of the mA and or kV according to patient size, iterative  reconstruction. FINDINGS:  No acute intraparenchymal hemorrhage or abnormal extra-axial fluid  collection.  The ventricles are normal size. Moderate white matter low  attenuation is present, nonspecific, likely chronic small vessel disease.  No  midline shift or mass effect. Generalized atrophy.  Included portion of the  paranasal sinuses and orbits grossly unremarkable.

## 2017-03-18 NOTE — PROGRESS NOTES
TRANSFER - IN REPORT:    Verbal report received from VIRA Gresham(name) on [address] [place] [address]  being received from ER(unit) for routine progression of care      Report consisted of patients Situation, Background, Assessment and   Recommendations(SBAR). Information from the following report(s) SBAR, Kardex, ED Summary and MAR was reviewed with the receiving nurse. Opportunity for questions and clarification was provided. Assessment completed upon patients arrival to unit and care assumed.

## 2017-03-18 NOTE — H&P
HOSPITALIST H&P/CONSULT  NAME:  Renee Haynes   Age:  80 y.o.  :   1928   MRN:   [id number]  PCP: Trae Robertson MD  Consulting MD:  Treatment Team: Attending Provider: Bianca Fox DO; Primary Nurse: Joyce Senior  HPI:   Patient 44N with pmhx of BPH, Parkinson, expressive aphasia presenting from the \"\" SNF with report of acute onset of right sided facial droop, left gaze deviation and right sided weakness beginning at approx 1 pm day of arrival. Family at bedside delivers hx d/t pt condition. States similar symptoms actually occurred the first time 4 days prior (tuesday night) with right sided weakness and facial droop and seem to have been improved the following morning. Occurred again today and facility requested transport to hospital. Symptoms have been improving during hospital course and at time of my interview his right sided facial droop was only remaining symptoms. At baseline pt has expressive aphasia but full motor strength. Can often communicate with \"thumbs up/down\".   Hospitalist asked to admit for CVA/TIA eval.    Complete ROS done and is as stated in HPI or otherwise negative  Past Medical History:   Diagnosis Date    BPH (benign prostatic hyperplasia) 2014    Cancer of the skin, basal cell     Dizziness 2014    Elevated PSA     GERD (gastroesophageal reflux disease)     IFG (impaired fasting glucose)     Lumbosacral spondylosis without myelopathy     OA (osteoarthritis)     Other ill-defined conditions(799.89)     Diverticula - BE     Parkinson disease ([facility] [address]     Pure hypercholesterolemia     Sciatica     Spinal stenosis, lumbar region, without neurogenic claudication     TIA (transient ischemic attack) 2014    Unspecified hyperplasia of prostate without urinary obstruction and other lower urinary tract symptoms (LUTS)       Past Surgical History:   Procedure Laterality Date    HX CYST REMOVAL      Chest wall cyst removed    HX HERNIA REPAIR Bilateral     Inguinal      Prior to Admission Medications   Prescriptions Last Dose Informant Patient Reported? Taking? alfuzosin SR (UROXATRAL) 10 mg SR tablet   No No   Sig: Take 1 Tab by mouth daily. carbidopa-levodopa (SINEMET)  mg per tablet   No No   Sig: Take 1.5 Tabs by mouth four (4) times daily. cholecalciferol, vitamin D3, 2,000 unit tab   Yes No   Sig: Take 1 Tab by mouth two (2) times a day. finasteride (PROSCAR) 5 mg tablet   No No   Sig: Take 1 Tab by mouth daily. furosemide (LASIX) 20 mg tablet   No No   Sig: TAKE 1 TABLET BY MOUTH EVERY OTHER DAY      Facility-Administered Medications: None     No Known Allergies   Social History   Substance Use Topics    Smoking status: Former Smoker    Smokeless tobacco: Not on file    Alcohol use No      Comment: 1 x per week      Family History   Problem Relation Age of Onset    Alzheimer Mother     Other Father      Bowel obstruction    Stroke Brother     Heart Attack Brother       Objective:     Visit Vitals    /81    Pulse (!) 113    Resp 12    Ht 5' 7\" (1.702 m)    Wt 68 kg (150 lb)    SpO2 99%    BMI 23.49 kg/m2      No data recorded. Oxygen Therapy  O2 Sat (%): 99 % (03/18/17 1715)  Pulse via Oximetry: 110 beats per minute (03/18/17 1715)  O2 Device: Room air (03/18/17 1533)  Physical Exam:  General:    Alert, aphasic. Thin appearing. Follows commands  Head:   Normocephalic, without obvious abnormality, atraumatic. Nose:  Nares normal. No drainage or sinus tenderness. Lungs:   Clear to auscultation bilaterally. No Wheezing or Rhonchi. No rales. Heart:   Regular rate and rhythm,  no murmur, rub or gallop. Abdomen:   Soft, non-tender. Not distended. Bowel sounds normal.   Extremities: No cyanosis. No edema. No clubbing  Skin:     Texture, turgor normal. No rashes or lesions. Not Jaundiced  Neurologic: Right sided facial droop. RUE 4/5 motor, RLE 5/5 motor.   Left sided 5/5 U/Le's  Data Review:   Recent Results (from the past 24 hour(s))   POC PT/INR    Collection Time: 03/18/17  3:31 PM   Result Value Ref Range    Prothrombin time (POC) 13.6 (H) 9.6 - 11.6 SECS    INR (POC) 1.1 0.9 - 1.2     CBC WITH AUTOMATED DIFF    Collection Time: 03/18/17  3:38 PM   Result Value Ref Range    WBC 7.0 4.3 - 11.1 K/uL    RBC 4.70 4.23 - 5.67 M/uL    HGB 13.4 (L) 13.6 - 17.2 g/dL    HCT 41.0 (L) 41.1 - 50.3 %    MCV 87.2 79.6 - 97.8 FL    MCH 28.5 26.1 - 32.9 PG    MCHC 32.7 31.4 - 35.0 g/dL    RDW 15.4 (H) 11.9 - 14.6 %    PLATELET 412 832 - 678 K/uL    MPV 10.0 (L) 10.8 - 14.1 FL    DF AUTOMATED      NEUTROPHILS 65 43 - 78 %    LYMPHOCYTES 23 13 - 44 %    MONOCYTES 9 4.0 - 12.0 %    EOSINOPHILS 3 0.5 - 7.8 %    BASOPHILS 0 0.0 - 2.0 %    IMMATURE GRANULOCYTES 0.1 0.0 - 5.0 %    ABS. NEUTROPHILS 4.6 1.7 - 8.2 K/UL    ABS. LYMPHOCYTES 1.6 0.5 - 4.6 K/UL    ABS. MONOCYTES 0.6 0.1 - 1.3 K/UL    ABS. EOSINOPHILS 0.2 0.0 - 0.8 K/UL    ABS. BASOPHILS 0.0 0.0 - 0.2 K/UL    ABS. IMM. GRANS. 0.0 0.0 - 0.5 K/UL   METABOLIC PANEL, COMPREHENSIVE    Collection Time: 03/18/17  3:38 PM   Result Value Ref Range    Sodium 141 136 - 145 mmol/L    Potassium 4.6 3.5 - 5.1 mmol/L    Chloride 104 98 - 107 mmol/L    CO2 27 21 - 32 mmol/L    Anion gap 10 7 - 16 mmol/L    Glucose 107 (H) 65 - 100 mg/dL    BUN 22 8 - 23 MG/DL    Creatinine 1.26 0.8 - 1.5 MG/DL    GFR est AA >60 >60 ml/min/1.73m2    GFR est non-AA 57 (L) >60 ml/min/1.73m2    Calcium 8.6 8.3 - 10.4 MG/DL    Bilirubin, total 0.6 0.2 - 1.1 MG/DL    ALT (SGPT) 8 (L) 12 - 65 U/L    AST (SGOT) 18 15 - 37 U/L    Alk.  phosphatase 110 50 - 136 U/L    Protein, total 7.5 6.3 - 8.2 g/dL    Albumin 3.3 3.2 - 4.6 g/dL    Globulin 4.2 (H) 2.3 - 3.5 g/dL    A-G Ratio 0.8 (L) 1.2 - 3.5     PROTHROMBIN TIME + INR    Collection Time: 03/18/17  3:38 PM   Result Value Ref Range    Prothrombin time 12.1 (H) 9.6 - 12.0 sec    INR 1.1 0.9 - 1.2     EKG, 12 LEAD, INITIAL    Collection Time: 03/18/17  3:38 PM Result Value Ref Range    Systolic BP  mmHg    Diastolic BP  mmHg    Ventricular Rate 104 BPM    Atrial Rate 105 BPM    P-R Interval 168 ms    QRS Duration 110 ms    Q-T Interval 352 ms    QTC Calculation (Bezet) 462 ms    Calculated P Axis 0 degrees    Calculated R Axis -76 degrees    Calculated T Axis 79 degrees    Diagnosis       !! AGE AND GENDER SPECIFIC ECG ANALYSIS !! Sinus tachycardia with Premature atrial complexes  Left axis deviation  Septal infarct (cited on or before 28-JUN-2016)  Abnormal ECG  When compared with ECG of 28-JUN-2016 09:34,  Premature atrial complexes are now Present     GLUCOSE, POC    Collection Time: 03/18/17  3:40 PM   Result Value Ref Range    Glucose (POC) 106 (H) 65 - 100 mg/dL     Imaging Don Supa /Studies   Final result (Exam End: 3/18/2017  3:21 PM) Open        Study Result      CT HEAD WITHOUT CONTRAST.     INDICATION: Acute right-sided weakness. CVA suspected. Codes stroke.     COMPARISON: 23 April 2016.      TECHNIQUE: 5 mm axial scans from the skull base to the vertex. Our CT  scanners use one or more of the following: Automated exposure control,  adjustment of the mA and or kV according to patient size, iterative  reconstruction.     FINDINGS: No acute intraparenchymal hemorrhage or abnormal extra-axial fluid  collection. The ventricles are normal size. Moderate white matter low  attenuation is present, nonspecific, likely chronic small vessel disease. No  midline shift or mass effect. Generalized atrophy. Included portion of the  paranasal sinuses and orbits grossly unremarkable.     IMPRESSION  IMPRESSION: Negative for acute intracranial abnormality. 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 ASA/statin. Pt/ot/ppd/speech therapy  - Parkisons disease - w/ expressive aphasia at baseline.  Cont home meds  - BPH - cont home meds  - underweight - consider nutrition consult pending speech eval.     Code Status: DNR as d/w family    Anticipated discharge: hep sq    Signed By: Ronnie Chacon DO     March 18, 2017

## 2017-03-18 NOTE — ED TRIAGE NOTES
Patient last seen normal at 1325. Patient arrives in ED with gaze to the left. Patient unable to move right arm or leg. Can squeeze hand on command on left side.

## 2017-03-19 PROBLEM — I61.9 CVA (CEREBROVASCULAR ACCIDENT DUE TO INTRACEREBRAL HEMORRHAGE) (HCC): Status: ACTIVE | Noted: 2017-01-01

## 2017-03-19 NOTE — PROGRESS NOTES
Problem: Dysphagia (Adult)  Goal: *Acute Goals and Plan of Care (Insert Text)  STG: Patient will swallow pureed diet and thin liquids by cup with no overt signs or symptoms of aspiration. STG: Patient will swallow regular textures once dentures in place, if indicated. STG: Patient will participate in Modified Barium Swallow study x1, if indicated. LTG: Patient will tolerate least restrictive diet consistency without respiratory compromise by discharge. Observation patient  Speech language pathology: bedside swallow note: Initial Assessment    NAME/AGE/GENDER: José Keenan is a 80 y.o. male  DATE: 3/19/2017  PRIMARY DIAGNOSIS: tia       ICD-10: Treatment Diagnosis: Oropharyngeal dysphagia (R13.12)  INTERDISCIPLINARY COLLABORATION: Registered Nurse  PRECAUTIONS/ALLERGIES: Review of patient's allergies indicates no known allergies. ASSESSMENT:Based on the objective data described below, Mr. Fabiola Sierra presents with no verbalizations and no command following. No family present. Patient is edentulous. Timely swallow with good laryngeal excursion and clear to cervical auscultation for thin liquids by cup and straw, puree and pudding. Patient nonverbal but no cough after swallow. Increased mastication time with delayed swallow for mixed consistency with immediate cough after swallow. Cracker deferred. Patient had Modified Barium Swallow (MBS) study performed 11/10/16 with following results:    \"Based on the objective data described below, the patient presents with mild oropharyngeal dysphagia. Patient tolerated several trials of thin liquids by cup with no penetration/aspiration. Brief premature spillage to the pyriform sinuses with thin liquid trials. Trace pharyngeal residue with smaller boluses of thin liquids and mild-moderate residual when taking larger sips which patient does clear spontaneously with a delayed second swallow.  While taking serial swallows from the straw impaired timing resulted in aspiration with ineffective, delayed cough between swallows. Patient tolerated pudding, mixed, and regular textures without penetration/aspiration and with minimal pharyngeal residue only. Mild increased time for mastication required with the cracker. Recommend regular textures/thin liquids by cup only. No straws. Small bites/sips with slow rate. \"  At this time, recommend pureed diet and thin liquids by cup only. No straws. Crush meds in puree. Patient may benefit from further assessment of swallow via Modified Barium Swallow study pending medical course. Need to determined if patient wears dentures and preferably would have those in place prior to repeat MBS. Patient will benefit from skilled intervention to address the below impairments. ?????? ? ? This section established at most recent assessment??????????  PROBLEM LIST (Impairments causing functional limitations):  1. dysphagia  REHABILITATION POTENTIAL FOR STATED GOALS: Fair  PLAN OF CARE:   Patient will benefit from skilled intervention to address the following impairments. RECOMMENDATIONS AND PLANNED INTERVENTIONS (Benefits and precautions of therapy have been discussed with the patient.):  · PO:  Pureed  · Liquids:  regular thin  MEDICATIONS:  · Crushed in puree  COMPENSATORY STRATEGIES/MODIFICATIONS INCLUDING:  · No straws  · Fully upright at 90 degrees for all PO  OTHER RECOMMENDATIONS (including follow up treatment recommendations): · Family training/education  RECOMMENDED DIET MODIFICATIONS DISCUSSED WITH:  · Nursing  · Patient  FREQUENCY/DURATION: Continue to follow patient 2 times a week for until goals met. RECOMMENDED REHABILITATION/EQUIPMENT: (at time of discharge pending progress):   Continue Skilled Therapy. SUBJECTIVE:   Patient nonverbal and does not follow commands. No family present. History of Present Injury/Illness: Mr. Curtis Cabrera  has a past medical history of BPH (benign prostatic hyperplasia) (2/2/2014);  Cancer of the skin, basal cell; Dizziness (2/2/2014); Elevated PSA; GERD (gastroesophageal reflux disease); IFG (impaired fasting glucose); Lumbosacral spondylosis without myelopathy; OA (osteoarthritis); Other ill-defined conditions(799.89); Parkinson disease ([facility] [place] [address] Pure hypercholesterolemia; Sciatica; Spinal stenosis, lumbar region, without neurogenic claudication; TIA (transient ischemic attack) (2/2/2014); and Unspecified hyperplasia of prostate without urinary obstruction and other lower urinary tract symptoms (LUTS). He also  has a past surgical history that includes hernia repair (Bilateral) and cyst removal.   Present Symptoms: altered mental status   Pain Intensity 1: 0  Current Medications:   No current facility-administered medications on file prior to encounter. Current Outpatient Prescriptions on File Prior to Encounter   Medication Sig Dispense Refill    furosemide (LASIX) 20 mg tablet TAKE 1 TABLET BY MOUTH EVERY OTHER DAY 15 Tab 12    carbidopa-levodopa (SINEMET)  mg per tablet Take 1.5 Tabs by mouth four (4) times daily. 180 Tab 12    finasteride (PROSCAR) 5 mg tablet Take 1 Tab by mouth daily. 30 Tab 12    alfuzosin SR (UROXATRAL) 10 mg SR tablet Take 1 Tab by mouth daily. 30 Tab 12    cholecalciferol, vitamin D3, 2,000 unit tab Take 1 Tab by mouth two (2) times a day. Current Dietary Status:  Regular textures, thin liquids    Social History/Home Situation: [facility]      OBJECTIVE:   Respiratory Status:        CXR Results:none this admission  MRI/CT Results:CT negative; MRI pending    Cognitive and Communication Status:  Neurologic State: Alert  Orientation Level: Unable to verbalize  Cognition: No command following        Safety/Judgement: Not assessed    BEDSIDE SWALLOW EVALUATION  Oral Assessment:  Oral Assessment  Labial: Impaired coordination  Dentition: Edentulous  Oral Hygiene: adequate  Lingual: Incoordinated  P.O.  Trials:  Patient Position: upright in bed    The patient was given the following: Consistency Presented: Thin liquid; Solid;Puree;Pudding;Mixed consistency; Ice chips  How Presented: SLP-fed/presented;Cup/sip;Cup/gulp; Spoon;Straw;Successive swallows    ORAL PHASE:  Bolus Acceptance: No impairment  Bolus Formation/Control: Impaired  Propulsion: Delayed (# of seconds)  Type of Impairment: Delayed;Mastication  Oral Residue: None    PHARYNGEAL PHASE:  Initiation of Swallow: No impairment  Laryngeal Elevation: Functional  Aspiration Signs/Symptoms: Weak cough  Vocal Quality:  (nonverbal)           Pharyngeal Phase Characteristics: No impairment, issues, or problems     OTHER OBSERVATIONS:  Rate/bite size: Questionable   Endurance:  Questionable      Tool Used: Dysphagia Outcome and Severity Scale (MIKE)    Score Comments   Normal Diet  [] 7 With no strategies or extra time needed   Functional Swallow  [] 6 May have mild oral or pharyngeal delay       Mild Dysphagia    [] 5 Which may require one diet consistency restricted (those who demonstrate penetration which is entirely cleared on MBS would be included)   Mild-Moderate Dysphagia  [] 4 With 1-2 diet consistencies restricted       Moderate Dysphagia  [] 3 With 2 or more diet consistencies restricted       Moderately Severe Dysphagia  [x] 2 With partial PO strategies (trials with ST only)       Severe Dysphagia  [] 1 With inability to tolerate any PO safely          Score:  Initial: 3 Most Recent: X (Date: -- )   Interpretation of Tool: The Dysphagia Outcome and Severity Scale (MIKE) is a simple, easy-to-use, 7-point scale developed to systematically rate the functional severity of dysphagia based on objective assessment and make recommendations for diet level, independence level, and type of nutrition. Score 7 6 5 4 3 2 1   Modifier CH CI CJ CK CL CM CN   ?  Swallowing:     - CURRENT STATUS: CL - 60%-79% impaired, limited or restricted    - GOAL STATUS:  CJ - 20%-39% impaired, limited or restricted    - D/C STATUS:  ---------------To be determined---------------  [place] / Product Type: Medicare /     TREATMENT:    (In addition to Assessment/Re-Assessment sessions the following treatments were rendered)  Assessment/Reassessment only, no treatment provided today  _____________________________________________________________________________________________  Safety:   After treatment position/precautions:  · RN notified  · Upright in Bed  Treatment Assessment:    Progression/Medical Necessity:   · Patient is expected to demonstrate progress in swallow timeliness, swallow function and diet tolerance to improve swallow safety, work toward diet advancement and decrease aspiration risk. Compliance with Program/Exercises: Will assess as treatment progresses. Reason for Continuation of Services/Other Comments:  · Patient continues to require skilled intervention due to medical complications. Recommendations/Intent for next treatment session: \"Treatment next visit will focus on diet tolerance\".     Total Treatment Duration:  Time In: 0276  Time Out: [address], MA, CCC-SLP

## 2017-03-19 NOTE — PROGRESS NOTES
Dual skin assessment done by Idalmis Rivera, ana maría and Sera chandra RN. No skin abnormalities noted. Sacrum intact with no redness noted. PT given call light but is aphasic so was checked on frequently for needs.

## 2017-03-19 NOTE — PROGRESS NOTES
Problem: Self Care Deficits Care Plan (Adult)  Goal: *Acute Goals and Plan of Care (Insert Text)  1. Eber Soto will complete self-feeding task with supervision to improve independence with basic ADL. 100 Elma Solo will demonstrate ability to sit edge of bed x 25 minutes with CGA to complete therapeutic activity/ADL. 100 Hoalice Solo will complete stand pivot transfer to chair with moderate assistance to improve independence with functional transfers. 100 Hoalice Solo will complete standing for hygiene and clothing management with minimal assistance. 100 Elma Solo will follow simple one step commands 90% of session to improve independence with ADL. Timeframe: 7 visits       OCCUPATIONAL THERAPY: Initial Assessment and AM 3/19/2017  OBSERVATION: Hospital Day: 2  [place] / [place] / Product Type: Medicare /      NAME/AGE/GENDER: Eber Soto is a 80 y.o. male          PRIMARY DIAGNOSIS:  tia <principal problem not specified> <principal problem not specified>        ICD-10: Treatment Diagnosis:        · Generalized Muscle Weakness (M62.81)  · Other lack of cordination (R27.8)  · History of falling (Z91.81)  · Abnormal posture (R29.3)   Precautions/Allergies:         Review of patient's allergies indicates no known allergies. ASSESSMENT:      Mr. Susie Estrada presents to the hospital with the onset of R sided weakness/facial droop. Pt was supine in bed upon arrival with supportive daughter at bedside. Pt is alert and cooperative. Pt has hx of aphasia and typically communicates with thumbs up/thumbs down. Pt able to respond occasionally with this method but does need additional time. Pt also has Parkinson's Disease and lives in skilled nursing facility. Pt is wheelchair bound at baseline (due to hx of falls) but is able to stand with staff. Daughter also reports that he is able to feed himself.  Pt is a pleasant gentleman and was able to follow commands for MMT. Pt able to move both upper extremities with 4+/5 strength in the LUE and 4/5 strength in the R UE (within available ROM). Pt able to transfer to edge of bed with moderate assistance. Pt maintains static sitting balance without assistance and propping with his UEs. Pt stood with therapist while patient care assistant helped with brief change (daughter reports he is a heavy wetter). Pt needed maximal assistance to stand with therapist but did progress to moderate at times. Pt tolerated standing for several minutes. Pt stands with narrow base of support and tends to lean posteriorly on heels with therapist working on facilitating better base of support and weight shift. Pt had bowel movement while standing with therapist. Pt did appear somewhat fatigued afterwards. Pt's daughter is very pleased stating that just yesterday he could barely move his R side and that he has made a \"360 turn\". Pt's daughter reports that he is no longer being followed by a neurologist due to his previous MD retiring. She feels that his medications for PD could be better optimized. Pt returned back to supine in bed afterwards due to waiting to go down for MRI. Pt able to smile at therapist and patient care assistant at end of session. Pt does not appear to have any significant R sided weakness as compared to baseline at this time. Pt did needs some occasionally cueing to look to the R with activity. Pt may be slightly below baseline at this time and will continue to benefit from OT services to address stated goals and plan of care. This section established at most recent assessment   PROBLEM LIST (Impairments causing functional limitations):  1. Decreased Strength  2. Decreased ADL/Functional Activities  3. Decreased Transfer Abilities  4. Decreased Balance  5. Decreased Activity Tolerance  6. Decreased Flexibility/Joint Mobility  7.  Decreased Cognition    INTERVENTIONS PLANNED: (Benefits and precautions of occupational therapy have been discussed with the patient.)  1. Activities of daily living training  2. Adaptive equipment training  3. Balance training  4. Clothing management  5. Cognitive training  6. Neuromuscular re-eduation  7. Theraputic activity  8. Theraputic exercise      TREATMENT PLAN: Frequency/Duration: Follow patient 3 times per week to address above goals. Rehabilitation Potential For Stated Goals: GOOD      RECOMMENDED REHABILITATION/EQUIPMENT: (at time of discharge pending progress): Continue Skilled Therapy. OCCUPATIONAL PROFILE AND HISTORY:   History of Present Injury/Illness (Reason for Referral):  See H&P  Past Medical History/Comorbidities:   Mr. Ricardo Wells  has a past medical history of BPH (benign prostatic hyperplasia) (2/2/2014); Cancer of the skin, basal cell; Dizziness (2/2/2014); Elevated PSA; GERD (gastroesophageal reflux disease); IFG (impaired fasting glucose); Lumbosacral spondylosis without myelopathy; OA (osteoarthritis); Other ill-defined conditions(799.89); Parkinson disease ([facility] [address] Pure hypercholesterolemia; Sciatica; Spinal stenosis, lumbar region, without neurogenic claudication; TIA (transient ischemic attack) (2/2/2014); and Unspecified hyperplasia of prostate without urinary obstruction and other lower urinary tract symptoms (LUTS). Mr. Ricardo Wells  has a past surgical history that includes hernia repair (Bilateral) and cyst removal.  Social History/Living Environment:   Home Environment: Skilled nursing facility  Living Alone: No  Support Systems: Child(michelle), Skilled nursing facility  Patient Expects to be Discharged to[de-identified] [address] [place] [address] facility  Tub or Shower Type: Shower  Prior Level of Function/Work/Activity:  Pt was living in skilled nursing facility. Pt was able to feed himself. Staff was assisting him with bathing/dressing tasks.  Pt was using a walker some time ago according to daughter, but was having too many falls so now patient is in a wheelchair. He stands with stand for transfer and during toileting hygiene/clothing management. Pt has hx of aphasia but typically able to respond with thumbs up/thumbs down. Personal Factors:          Sex:  male        Age:  80 y.o. Social Background:  Pt lives in skilled nursing facility and needs a fair amount of assistance with basic ADL. Other factors that influence how disability is experienced by the patient:  Pt is aphasic with hx of Parkinson's Disease    Number of Personal Factors/Comorbidities that affect the Plan of Care: Extensive review of physical, cognitive, and psychosocial performance (3+):  HIGH COMPLEXITY   ASSESSMENT OF OCCUPATIONAL PERFORMANCE[de-identified]   Activities of Daily Living:           Basic ADLs (From Assessment) Complex ADLs (From Assessment)   Basic ADL  Feeding: Moderate assistance  Oral Facial Hygiene/Grooming: Maximum assistance  Bathing: Maximum assistance  Upper Body Dressing: Total assistance  Lower Body Dressing: Total assistance  Toileting: Total assistance Instrumental ADL  Meal Preparation: Total assistance  Homemaking: Total assistance   Grooming/Bathing/Dressing Activities of Daily Living     Cognitive Retraining  Safety/Judgement: Fall prevention                 Functional Transfers  Toilet Transfer : Maximum assistance  Tub Transfer: Total assistance  Shower Transfer: Total assistance     Bed/Mat Mobility  Rolling: Moderate assistance  Supine to Sit: Moderate assistance  Sit to Supine: Moderate assistance  Sit to Stand: Moderate assistance  Bed to Chair: Maximum assistance  Scooting: Moderate assistance          Most Recent Physical Functioning:   Gross Assessment:                  Posture:     Balance:  Sitting: Impaired  Sitting - Static: Prop sitting  Sitting - Dynamic: Fair (occasional)  Standing: Impaired  Standing - Static: Poor Bed Mobility:  Rolling: Moderate assistance  Supine to Sit: Moderate assistance  Sit to Supine:  Moderate assistance  Scooting: Moderate assistance  Wheelchair Mobility:     Transfers:  Sit to Stand: Moderate assistance  Stand to Sit: Moderate assistance  Bed to Chair: Maximum assistance              Patient Vitals for the past 6 hrs:       BP SpO2 Pulse   17 0902 148/88 96 % 100   17 1105 150/89 100 % 90        Mental Status  Neurologic State: Alert  Orientation Level: Unable to verbalize  Cognition: Decreased command following  Perception: Cues to maintain midline in sitting, Cues to maintain midline in standing  Perseveration: No perseveration noted  Safety/Judgement: Fall prevention                               Physical Skills Involved:  1. Range of Motion  2. Balance  3. Mobility  4. Strength  5. Endurance  6. Fine or Gross Motor Coordination Cognitive Skills Affected (resulting in the inability to perform in a timely and safe manner):  1. Attending  2. Understanding  3. Problem Solving  4. Mental Sequencing  5. Remembering Psychosocial Skills Affected:  1. Routines and Behaviors   Number of elements that affect the Plan of Care: 5+:  HIGH COMPLEXITY   CLINICAL DECISION MAKIN[address] [place] [id number] AM-PAC 6 Clicks   Basic Mobility Inpatient Short Form  How much help from another person does the patient currently need. .. Total A Lot A Little None   1. Putting on and taking off regular lower body clothing? [X] 1   [ ] 2   [ ] 3   [ ] 4   2. Bathing (including washing, rinsing, drying)? [ ] 1   [X] 2   [ ] 3   [ ] 4   3. Toileting, which includes using toilet, bedpan or urinal?   [X] 1   [ ] 2   [ ] 3   [ ] 4   4. Putting on and taking off regular upper body clothing?   [ ] 1   [X] 2   [ ] 3   [ ] 4   5. Taking care of personal grooming such as brushing teeth? [ ] 1   [X] 2   [ ] 3   [ ] 4   6. Eating meals? [ ] 1   [X] 2   [ ] 3   [ ] 4   © , Trustees of [address] [place] [address] [address] [id number], under license to [employer].  All rights reserved    Score:  Initial: 10 Most Recent: X (Date: -- ) Interpretation of Tool:  Represents activities that are increasingly more difficult (i.e. Bed mobility, Transfers, Gait). Score 24 23 22-20 19-15 14-10 9-7 6       Modifier CH CI CJ CK CL CM CN         · Self Care:               - CURRENT STATUS:    CL - 60%-79% impaired, limited or restricted               - GOAL STATUS:           CK - 40%-59% impaired, limited or restricted               - D/C STATUS:                       ---------------To be determined---------------  [place] / Product Type: Medicare /       Medical Necessity:     · Patient demonstrates good and fair rehab potential due to higher previous functional level. Reason for Services/Other Comments:  · Patient continues to require skilled intervention due to decreased independence with ADL/functional transfers. Use of outcome tool(s) and clinical judgement create a POC that gives a: HIGH COMPLEXITY             TREATMENT:   (In addition to Assessment/Re-Assessment sessions the following treatments were rendered)      Pre-treatment Symptoms/Complaints:  Nods \"no\" to having pain  Pain: Initial:   Pain Intensity 1: 0  Post Session:  0/10      Assessment/Reassessment only, no treatment provided today     Braces/Orthotics/Lines/Etc:   · O2 Nasal Cannula  Treatment/Session Assessment:    · Response to Treatment:  Evaluation only. · Interdisciplinary Collaboration:  · Occupational Therapist  · Registered Nurse  · Certified Nursing Assistant/Patient Care Technician  · After treatment position/precautions:  · Supine in bed  · Bed/Chair-wheels locked  · Call light within reach  · RN notified  · Family at bedside  · Compliance with Program/Exercises: Will assess as treatment progresses. · Recommendations/Intent for next treatment session: \"Next visit will focus on advancements to more challenging activities and reduction in assistance provided\".   Total Treatment Duration:  OT Patient Time In/Time Out  Time In: 1100  Time Out: [address], OT

## 2017-03-19 NOTE — DISCHARGE INSTRUCTIONS

## 2017-03-20 PROBLEM — I63.9 ACUTE CVA (CEREBROVASCULAR ACCIDENT) (HCC): Status: ACTIVE | Noted: 2017-01-01

## 2017-03-20 NOTE — PROGRESS NOTES
Dr. Antoinette Núñez made aware of gpc in urine culture. Currently on rocephin.  Afebrile at this time

## 2017-03-20 NOTE — HOSPICE
[facility]-    Met with daughter in family room and then also talked with pt about hospice services/support. Discussed hospice philosphy of care, care at home, care team members and freq of visits. Also discussed the [facility] for respite and symptom management  Parmjit Cutler tearful at times but states she wants her father to have support and she wants him to remain at [facility]. Dr Israel Cohn called and approved for routine hospice care at the [facility]. Gely Fuller CM and Dr Martin Bartholomew made aware. The plan-  1.  DC 3/22 0900 via ambulance to [facility]  2. STEFAN Rn to admit into hospice services at 1030  3.   No DME needs at this time (pt has WC, BSC, shower bench) and daughter prefers pt to remain in his own bed    Thank you-  Oliver Park RN  [facility] liaison  [phone]

## 2017-03-20 NOTE — PROGRESS NOTES
Speech Pathology  ST attempted. However, pt getting a bath. Will continue to follow. Thank you.     Masha Ansari, [facility], [facility] JACKELIN DAVINA LEAL, CCC-SLP

## 2017-03-20 NOTE — PROGRESS NOTES
Care Management Interventions  PCP Verified by CM: Yes  Transition of Care Consult (CM Consult): Discharge Planning, Assisted Living  Discharge Durable Medical Equipment: No  Physical Therapy Consult: Yes  Occupational Therapy Consult: Yes  Current Support Network: Assisted Living  Confirm Follow Up Transport: Family  Plan discussed with Pt/Family/Caregiver: Yes  Freedom of Choice Offered: Yes    SW met with patient/ daughter this date. Daughter/ Alejandro Medina requested to talk in family room. Daughter would like contact information updated for Cell number [phone]/ Landline [phone] Qsot [phone]. Daughter is very tearful today. She stated she is overwhelmed. She is the only child, her mother  several years ago after a long charles with Alzheimer's. Daughter works full time and is primary caregiver to her 9year old granddaughter. Daughter affirmed she does have some support w a boyfriend and supportive friends. Pt had been living at the [facility] for the past 2 years. SW discussed various options w daughter including STR in a SNF. Daughter stated she would like if possible for her dad to return to the [facility] with Hospice if appropriate. Daughter stated that father \"does not want to be a burden\" and she feels like \"he just wants to die\"   Daughter expressed she wants to support her father's wishes, stated he was happy at [facility]. Daughter had questions about observation status- SW advised that pt was observation status and has since been switched to Inpatient. Letter will be provided for additional questions/ answer and support. Copy of Signed SEPULVEDA Letter on chart - copy to dtr and pt    Daughter provided with this writer's contact information. [facility] will be calling to meet with daughter/ patient this date.

## 2017-03-20 NOTE — PROGRESS NOTES
Pharmacokinetic Consult to Pharmacist    Gary Mack is a 80 y.o. male being treated for UTI with vancomycin and ceftriaxone. Patient has a history of UTI due to enterococcus. Height: 5' 7\" (170.2 cm)  Weight: 68 kg (150 lb)  Lab Results   Component Value Date/Time    BUN 20 03/20/2017 06:37 AM    Creatinine 1.11 03/20/2017 06:37 AM    WBC 5.6 03/20/2017 06:37 AM    Procalcitonin 1.0 06/28/2016 09:42 AM      Estimated Creatinine Clearance: 42.2 mL/min (based on Cr of 1.11). CULTURES:  3/18  Urine Cx: GPC, prelim    Day 1 of vancomycin. Goal trough is 12-18 for UTI. Vancomycin dose initiated at 1250 mg IV q24h. Plan to check a steady state level as indicated. Pharmacy will continue to follow. Please call with any questions.     Thank you,  Juan Hong, PharmD  Clinical Pharmacist  [phone]

## 2017-03-20 NOTE — PROGRESS NOTES
Hospitalist Progress Note    3/20/2017  Admit Date: 3/18/2017  3:02 PM   NAME: Awilda Callahan   :  1928   MRN:  [id number]   Attending: Bisi Cameron DO  PCP:  Asmita Lucas MD    SUBJECTIVE:    'Patient 89K with pmhx of BPH, Parkinson, expressive aphasia presenting from the \"\" SNF with report of acute onset of right sided facial droop, left gaze deviation and right sided weakness. At baseline pt has expressive aphasia but full motor strength. Can often communicate with \"thumbs up/down\". Hospitalist asked to admit for CVA/TIA eval.  MRI confirms right basal ganglia infarct. Carotid duplex w/o significant stenosis. Echocardiogram pending'     3-19 - pt aphasic. Does do \"thumbs up\" when asked if feeling ok, denies pain. Neuro deficits seemingly at baseline from before yesterday. 3/20/17- Seen with daughter, Lisa Vasquez at bedside. She states he is not as interactive as yesterday. Urine culture growing GPC and only on Rocephin currently. Will add vanc for now. He is afebrile. He shakes his head 'no' to pain. Follows command of giving a 'thumbs up.'  Re-discussed code status as he is currently listed as full code and his daughter broke into tears stating he does not want resuscitation and does not want to continue to live in the state he is in for prolonged time. She has thought about hospice in the past and was considering having evaluation at the [facility]. She would like to talk to hospice while here.         Review of Systems negative with exception of pertinent positives noted above  PHYSICAL EXAM     Visit Vitals    /77 (BP 1 Location: Left arm, BP Patient Position: At rest)    Pulse 69    Temp 97.4 °F (36.3 °C)    Resp 19    Ht 5' 7\" (1.702 m)    Wt 68 kg (150 lb)    SpO2 95%    BMI 23.49 kg/m2      Temp (24hrs), Av °F (36.7 °C), Min:96.7 °F (35.9 °C), Max:99.1 °F (37.3 °C)    Oxygen Therapy  O2 Sat (%): 95 % (17 0859)  Pulse via Oximetry: 110 beats per minute (03/18/17 1715)  O2 Device: Nasal cannula (03/20/17 0859)  O2 Flow Rate (L/min): 2 l/min (decreased to 1) (03/20/17 0859)    Intake/Output Summary (Last 24 hours) at 03/20/17 0904  Last data filed at 03/19/17 1821   Gross per 24 hour   Intake              480 ml   Output                0 ml   Net              480 ml      General: No acute distress, groggy initially then woke up and followed simple commands   Lungs:  CTA Bilaterally. Heart:  Regular rate and rhythm,  No murmur, rub, or gallop  Abdomen: Soft, Non distended, Non tender, Positive bowel sounds  Extremities: No cyanosis, clubbing or edema  Neurologic:  Aphasic    Recent Results (from the past 24 hour(s))   METABOLIC PANEL, BASIC    Collection Time: 03/20/17  6:37 AM   Result Value Ref Range    Sodium 139 136 - 145 mmol/L    Potassium 3.8 3.5 - 5.1 mmol/L    Chloride 105 98 - 107 mmol/L    CO2 29 21 - 32 mmol/L    Anion gap 5 (L) 7 - 16 mmol/L    Glucose 84 65 - 100 mg/dL    BUN 20 8 - 23 MG/DL    Creatinine 1.11 0.8 - 1.5 MG/DL    GFR est AA >60 >60 ml/min/1.73m2    GFR est non-AA >60 >60 ml/min/1.73m2    Calcium 9.0 8.3 - 10.4 MG/DL   CBC W/O DIFF    Collection Time: 03/20/17  6:37 AM   Result Value Ref Range    WBC 5.6 4.3 - 11.1 K/uL    RBC 4.27 4.23 - 5.67 M/uL    HGB 11.8 (L) 13.6 - 17.2 g/dL    HCT 37.3 (L) 41.1 - 50.3 %    MCV 87.4 79.6 - 97.8 FL    MCH 27.6 26.1 - 32.9 PG    MCHC 31.6 31.4 - 35.0 g/dL    RDW 15.6 (H) 11.9 - 14.6 %    PLATELET 312 507 - 715 K/uL    MPV 10.1 (L) 10.8 - 14.1 FL   GLUCOSE, POC    Collection Time: 03/20/17  7:25 AM   Result Value Ref Range    Glucose (POC) 82 65 - 100 mg/dL     MRI BRAIN WO CONT   Final Result   IMPRESSION: Acute/subacute small right basal ganglia infarct. No acute   hemorrhage. Generalized atrophy and chronic small vessel disease. DUPLEX CAROTID BILATERAL   Final Result   IMPRESSION:     PREETI:  No hemodynamically significant stenosis. LICA:  No hemodynamically significant stenosis. Tortuous PREETI consistent with long-standing hypertension. Relatively modest   atherosclerotic disease in the bilateral carotid arteries. CT HEAD WO CONT   Final Result   IMPRESSION:  Negative for acute intracranial abnormality. Chronic changes. ASSESSMENT      Active Hospital Problems    Diagnosis Date Noted    CVA (cerebrovascular accident due to intracerebral hemorrhage) ([facility] [address] 03/19/2017    Weakness due to cerebrovascular accident (CVA) ([address] 03/18/2017    Expressive speech disorder 04/14/2016    Parkinson disease ([facility] [address]     BPH (benign prostatic hyperplasia) 02/02/2014     Plan:  · GPC UTI- add on vanc and continue rocephin for now. · R basal ganglia CVA- strength getting back to baseline. On pureed diet. Speech following. · Advanced Parkinson's disease- consult hospice to help with care and to transition back to [facility] on hospice. Aspirin/statin. · Given multiple co-morbidities including acute CVA and GPC UTI requiring IV antibiotics, will transition him to inpatient status. · After long discussion with Antoinette Cortez, Mr. Leger's daughter, his code status changed to DNR. DVT Prophylaxis: Heparin. Signed By: Sonja Fishman MD     March 20, 2017

## 2017-03-20 NOTE — PROGRESS NOTES
Problem: Mobility Impaired (Adult and Pediatric)  Goal: *Acute Goals and Plan of Care (Insert Text)  LTG:  (1.)Mr. Anand Martinez will move from supine to sit and sit to supine , scoot up and down and roll side to side in bed with MINIMAL ASSIST within 7 day(s). (2.)Mr. Anand Martinez will transfer from bed to chair and chair to bed with MINIMAL ASSIST using the least restrictive device within 7 day(s). (3.)Mr. Leger will ambulate with MINIMAL ASSIST for 10+ feet with the least restrictive device within 7 day(s). (4.)Mr. Leger will improve standing balance to FAIR with least restrictive device within 7 days. ________________________________________________________________________________________________      PHYSICAL THERAPY: INITIAL ASSESSMENT, AM 3/20/2017  INPATIENT: Hospital Day: 3  Payor: /      NAME/AGE/GENDER: Cullen Cooper is a 80 y.o. male          PRIMARY DIAGNOSIS: tia  CVA (cerebrovascular accident due to intracerebral hemorrhage) ([facility] [address]  Acute CVA (cerebrovascular accident) ([facility] [address] CVA (cerebrovascular accident due to intracerebral hemorrhage) ([facility] [address] CVA (cerebrovascular accident due to intracerebral hemorrhage) ([facility] [address]        ICD-10: Treatment Diagnosis:       · Generalized Muscle Weakness (M62.81)  · Other lack of cordination (R27.8)  · Difficulty in walking, Not elsewhere classified (R26.2)  · Other abnormalities of gait and mobility (R26.89)   Precaution/Allergies:  Review of patient's allergies indicates no known allergies. ASSESSMENT:      Mr. Anand Matrinez presents to physical therapy after admission for suspected TIA/CVA with R sided weakness and decreased attention to R. He is supine upon arrival, daughter present. At baseline, pt is not verbal with hx of PD, CVA, dementia. He lives at the [facility] and is WC dependent. Typically communicates with thumbs up/down. Initially, he was able to follow some commands but was not communicating.  Attends to L side in supine and occasionally to R. Initiates movement but requires MOD A to roll L/R with brief change. Performs bed mobility with MAX A but is able to maintain sitting balance with BUE support. Increased alertness and attention to R in sitting. O2 100% on 1L; removed. Stands with MOD A x2 and takes a few steps towards chair with MAX A x2. Seated in chair with daughter requesting posey pad. At this time, daughter and pt in discussion with hospice RN. Alerted primary RN that pt is seated in chair and posey pad has not yet been applied but is available in the room. Mr. Hali Lundy will benefit from skilled PT intervention during acute care stay to improve bed mobility, transfers, and balance. Expecting to D/C to [address] [place] [address] with hospice. This section established at most recent assessment   PROBLEM LIST (Impairments causing functional limitations):  1. Decreased Strength  2. Decreased ADL/Functional Activities  3. Decreased Transfer Abilities  4. Decreased Ambulation Ability/Technique  5. Decreased Balance    INTERVENTIONS PLANNED: (Benefits and precautions of physical therapy have been discussed with the patient.)  1. Balance Exercise  2. Bed Mobility  3. Family Education  4. Gait Training  5. Neuromuscular Re-education/Strengthening  6. Therapeutic Activites  7. Therapeutic Exercise/Strengthening  8. Transfer Training      TREATMENT PLAN: Frequency/Duration: 2 times a week for duration of hospital stay  Rehabilitation Potential For Stated Goals: Sukumar Adair REHABILITATION/EQUIPMENT: (at time of discharge pending progress): Return to [facility] with hospice.                    HISTORY:   History of Present Injury/Illness (Reason for Referral):  Per H&P, \"Patient 89M with pmhx of BPH, Parkinson, expressive aphasia presenting from the \"[facility] SNF with report of acute onset of right sided facial droop, left gaze deviation and right sided weakness beginning at approx 1 pm day of arrival. Family at bedside delivers hx d/t pt condition. States similar symptoms actually occurred the first time 4 days prior (tuesday night) with right sided weakness and facial droop and seem to have been improved the following morning. Occurred again today and facility requested transport to hospital. Symptoms have been improving during hospital course and at time of my interview his right sided facial droop was only remaining symptoms. At baseline pt has expressive aphasia but full motor strength. Can often communicate with \"thumbs up/down\". Hospitalist asked to admit for CVA/TIA eval.\"     Past Medical History/Comorbidities:   Mr. Dilcia Coleman  has a past medical history of BPH (benign prostatic hyperplasia) (2/2/2014); Cancer of the skin, basal cell; Dizziness (2/2/2014); Elevated PSA; GERD (gastroesophageal reflux disease); IFG (impaired fasting glucose); Lumbosacral spondylosis without myelopathy; OA (osteoarthritis); Other ill-defined conditions(799.89); Parkinson disease ([facility] [address] Pure hypercholesterolemia; Sciatica; Spinal stenosis, lumbar region, without neurogenic claudication; TIA (transient ischemic attack) (2/2/2014); and Unspecified hyperplasia of prostate without urinary obstruction and other lower urinary tract symptoms (LUTS). Mr. Dilcia Coleman  has a past surgical history that includes hernia repair (Bilateral) and cyst removal.  Social History/Living Environment:   Home Environment: [address] [place] [address] Name: [place]  [facility] Residence: One story  Living Alone: No  Support Systems: Assisted living, Child(michelle)  Patient Expects to be Discharged to[de-identified] Hospice (comment)  Current DME Used/Available at Home: Wheelchair  Tub or Shower Type: Shower  Prior Level of Function/Work/Activity:  Mr. Dilcia Coleman lives at [facility], mainly bed/WC bound. Does stand with staff.  Nonverbal.      Number of Personal Factors/Comorbidities that affect the Plan of Care:  Age  Multiple co-morbidities  Low PLOF 3+: HIGH COMPLEXITY   EXAMINATION:   Most Recent Physical Functioning:   Gross Assessment:  AROM: Generally decreased, functional  Strength: Generally decreased, functional               Posture:  Posture (WDL): Exceptions to WDL  Posture Assessment: Forward head, Rounded shoulders  Balance:  Sitting: Impaired  Sitting - Static: Prop sitting  Sitting - Dynamic: Fair (occasional)  Standing: Impaired  Standing - Static: Poor  Standing - Dynamic : Poor Bed Mobility:  Rolling: Moderate assistance  Supine to Sit: Maximum assistance  Scooting: Total assistance  Wheelchair Mobility:     Transfers:  Sit to Stand: Moderate assistance;Assist x2  Stand to Sit: Moderate assistance;Assist x2  Bed to Chair: Maximum assistance  Gait:     Base of Support: Narrowed; Center of gravity altered  Speed/Susan: Delayed;Pace decreased (<100 feet/min)  Step Length: Right shortened;Left shortened  Gait Abnormalities: Decreased step clearance; Other (Posterior lean)  Distance (ft): 3 Feet (ft)  Assistive Device:  (Hand held assist x2)  Ambulation - Level of Assistance: Maximum assistance;Assist x2       Body Structures Involved:  1. Nerves  2. Muscles Body Functions Affected:  1. Neuromusculoskeletal  2. Movement Related Activities and Participation Affected:  1. General Tasks and Demands  2. Communication  3. Mobility  4. Self Care  5. Domestic Life   Number of elements that affect the Plan of Care: 4+: HIGH COMPLEXITY   CLINICAL PRESENTATION:   Presentation: Stable and uncomplicated: LOW COMPLEXITY   CLINICAL DECISION MAKIN [facility] [place] [address] [address] AM-PAC 6 Clicks   Basic Mobility Inpatient Short Form  How much difficulty does the patient currently have. .. Unable A Lot A Little None   1. Turning over in bed (including adjusting bedclothes, sheets and blankets)? [ ] 1   [X] 2   [ ] 3   [ ] 4   2. Sitting down on and standing up from a chair with arms ( e.g., wheelchair, bedside commode, etc.)   [ ] 1   [X] 2   [ ] 3   [ ] 4   3.   Moving from lying on back to sitting on the side of the bed? [ ] 1   [X] 2   [ ] 3   [ ] 4   How much help from another person does the patient currently need. .. Total A Lot A Little None   4. Moving to and from a bed to a chair (including a wheelchair)? [ ] 1   [X] 2   [ ] 3   [ ] 4   5. Need to walk in hospital room? [ ] 1   [X] 2   [ ] 3   [ ] 4   6. Climbing 3-5 steps with a railing? [X] 1   [ ] 2   [ ] 3   [ ] 4   © 2007, Trustees of [employer], under license to [employer]. All rights reserved    Score:  Initial: 11 Most Recent: X (Date: 3/20/17 )     Interpretation of Tool:  Represents activities that are increasingly more difficult (i.e. Bed mobility, Transfers, Gait). Score 24 23 22-20 19-15 14-10 9-7 6       Modifier CH CI CJ CK CL CM CN         · Mobility - Walking and Moving Around:               - CURRENT STATUS:    CL - 60%-79% impaired, limited or restricted               - GOAL STATUS:           CL - 60%-79% impaired, limited or restricted               - D/C STATUS:                       ---------------To be determined---------------  Payor: /       Medical Necessity:     · Pt will benefit from PT intervention to improve functional mobility and balance. Reason for Services/Other Comments:  · Patient continues to require skilled intervention due to increased assistance required for functional activity.    Use of outcome tool(s) and clinical judgement create a POC that gives a: Clear prediction of patient's progress: LOW COMPLEXITY                 TREATMENT:   (In addition to Assessment/Re-Assessment sessions the following treatments were rendered)   Pre-treatment Symptoms/Complaints:  Non-verbal  Pain: Initial:   Pain Intensity 1: 0  Post Session: Visually 0/10      Assessment/Reassessment only, no treatment provided today     Braces/Orthotics/Lines/Etc:   · IV  Treatment/Session Assessment:    · Response to Treatment:  Pt able to provide some assistance with mobility and take a few steps to chair  · Interdisciplinary Collaboration:  · Physical Therapist  · Registered Nurse  · After treatment position/precautions:  · Up in chair  · Bed/Chair-wheels locked  · Call light within reach  · RN notified  · Family at bedside  · Hospice nurse at bedside  · Compliance with Program/Exercises: Will assess as treatment progresses. · Recommendations/Intent for next treatment session: \"Next visit will focus on advancements to more challenging activities and reduction in assistance provided\".   Total Treatment Duration:  PT Patient Time In/Time Out  Time In: 1127  Time Out: [address]

## 2017-03-21 NOTE — INTERDISCIPLINARY ROUNDS
Interdisciplinary team rounds were held 3/21/2017 with the following team members:Care Management, Nursing, Pastoral Care, Pharmacy and Physician. Plan of care discussed. See clinical pathway and/or care plan for interventions and desired outcomes.

## 2017-03-21 NOTE — PROGRESS NOTES
SPEECH PATHOLOGY NOTE:    Patient is discharging to rehab with hospice. Recommend continue current diet.       Sarai Marie MS, SLP

## 2017-03-21 NOTE — PROGRESS NOTES
Hospitalist Progress Note    3/21/2017  Admit Date: 3/18/2017  3:02 PM   NAME: Nathanael Belcher   :  1928   DOS:              17  MRN:  [id number]   Attending: Rehana Snyder MD  PCP:  Remedios Bernstein MD  Treatment Team: Attending Provider: Kb Fontaine DO; Utilization Review: Desean Becker RN; Care Manager: Cruz Rahman    DNR     SUBJECTIVE:   As previously documented: \" Mr. Kimberly Mabry is a 79 yo M with PMHx of BPH, Parkinson, expressive aphasia presenting from the \"[facility] with report of acute onset of right sided facial droop, left gaze deviation and right sided weakness. At baseline pt has expressive aphasia but full motor strength. Can often communicate with \"thumbs up/down\". MRI brain confirms right basal ganglia infarct. Carotid duplex w/o significant stenosis. Echocardiogram with EF:40-45%. Code status changed to DNR per daughter wishes . Seen by hospice with plan to d/c to skilled nursing with hospice care. \"           17  Mr. Nathanael Belcher is in no acute distress afebrile. Denies any chest pain, SOB or abdominal pain by pointing thumb up/down. No family at bedside. On IV Rocephin/Vanc for UTI  - urine culture [facility]        10+ ROS reviewed and negative except for positive in HPI  No Known Allergies  Current Facility-Administered Medications   Medication Dose Route Frequency Provider Last Rate Last Dose    vancomycin (VANCOCIN) 1250 mg in  ml infusion  1,250 mg IntraVENous Q24H [facility].  Sarah Gannon .7 mL/hr at 17 1000 1,250 mg at 17 1000    tamsulosin (FLOMAX) capsule 0.4 mg  0.4 mg Oral DAILY Henretta Minal, DO   0.4 mg at 17 2185    carbidopa-levodopa (SINEMET)  mg per tablet 1.5 Tab  1.5 Tab Oral QID Henretta Minal, DO   1.5 Tab at 17 2228    cholecalciferol (VITAMIN D3) tablet 2,000 Units  2,000 Units Oral BID Henretta Minal, DO   2,000 Units at 17 1653    finasteride (PROSCAR) tablet 5 mg  5 mg Oral DAILY Raman Clark C Gomez, DO   5 mg at 17 8580    furosemide (LASIX) tablet 20 mg  20 mg Oral DAILY Dorinda Analisa, DO   20 mg at 17 7344    sodium chloride (NS) flush 5-10 mL  5-10 mL IntraVENous Q8H Dorinda Analisa, DO   10 mL at 17 0535    sodium chloride (NS) flush 5-10 mL  5-10 mL IntraVENous PRN Dorinda Analisa, DO        aspirin chewable tablet 81 mg  81 mg Oral DAILY Dorinda Analisa, DO   81 mg at 17 5073    atorvastatin (LIPITOR) tablet 40 mg  40 mg Oral QHS Dorinda Analisa, DO   40 mg at 17 2227    heparin (porcine) injection 5,000 Units  5,000 Units SubCUTAneous Q8H Dorinda Analisa, DO   5,000 Units at 17 0535    divalproex (DEPAKOTE SPRINKLE) capsule 125 mg  125 mg Oral TID PRN Dorinda Analisa, DO        0.9% sodium chloride infusion  75 mL/hr IntraVENous CONTINUOUS Dorinda Analisa, DO 75 mL/hr at 17 1835 75 mL/hr at 17 1835         PHYSICAL EXAM     Visit Vitals    BP (!) 146/97 (BP 1 Location: Left arm, BP Patient Position: At rest)    Pulse 85    Temp 97.8 °F (36.6 °C)    Resp 18    Ht 5' 7\" (1.702 m)    Wt 68 kg (150 lb)    SpO2 95%    BMI 23.49 kg/m2      Temp (24hrs), Av.8 °F (36.6 °C), Min:97.4 °F (36.3 °C), Max:98.5 °F (36.9 °C)    Oxygen Therapy  O2 Sat (%): 95 % (17 0712)  Pulse via Oximetry: 110 beats per minute (17 1715)  O2 Device: Room air (17 0650)  O2 Flow Rate (L/min): 1 l/min (17 1146)    Intake/Output Summary (Last 24 hours) at 17 0716  Last data filed at 17 1355   Gross per 24 hour   Intake             1774 ml   Output                0 ml   Net             1774 ml        Physical Exam:  General:         Alert, cooperative, no distress. Afebrile. Cooperative. Frail. HEENT:               NCAT. No obvious deformity. Nares normal. No drainage  Lungs:  CTABL. No wheezing/rhonchi/rales  Cardiovascular:   RRR. No m/r/g. No pedal edema b/l. +2 PT/DT pulses b/l. Abdomen:       S/nt/nd.   Bowel sounds normal. .   Skin:         No rashes or lesions. Not Jaundiced  Neurologic:   Alert,. CN II- XII grossly WNL, except aphasia. Moves all extremities. Psychiatric:         Euthymic. Normal affect. DIAGNOSTIC STUDIES      Data Review:   Recent Results (from the past 24 hour(s))   GLUCOSE, POC    Collection Time: 03/20/17  7:25 AM   Result Value Ref Range    Glucose (POC) 82 65 - 100 mg/dL   MRSA SCREEN - PCR (NASAL)    Collection Time: 03/20/17 10:07 AM   Result Value Ref Range    Special Requests: NO SPECIAL REQUESTS      Culture result: (A)       MRSA target DNA detected, SA target DNA detected. A positive test result does not necessarily indicate the presence of viable organisms. It is however, presumptive for the presence of MRSA or SA. Culture result:        RESULTS VERIFIED, PHONED TO AND READ BACK BY  PATI SMALL ON 03/20/17 @1310, ADS     GLUCOSE, POC    Collection Time: 03/20/17 11:43 AM   Result Value Ref Range    Glucose (POC) 93 65 - 100 mg/dL   GLUCOSE, POC    Collection Time: 03/20/17  4:33 PM   Result Value Ref Range    Glucose (POC) 100 65 - 100 mg/dL   GLUCOSE, POC    Collection Time: 03/20/17  8:42 PM   Result Value Ref Range    Glucose (POC) 111 (H) 65 - 100 mg/dL   GLUCOSE, POC    Collection Time: 03/21/17  6:36 AM   Result Value Ref Range    Glucose (POC) 89 65 - 100 mg/dL     Imaging /Procedures /Studies:    MRI BRAIN WO CONT   Final Result   IMPRESSION: Acute/subacute small right basal ganglia infarct. No acute   hemorrhage. Generalized atrophy and chronic small vessel disease.           DUPLEX CAROTID BILATERAL   Final Result   IMPRESSION:    PREETI: No hemodynamically significant stenosis.       LICA: No hemodynamically significant stenosis.       Tortuous PREETI consistent with long-standing hypertension.  Relatively modest   atherosclerotic disease in the bilateral carotid arteries.       CT HEAD WO CONT   Final Result   IMPRESSION: Negative for acute intracranial abnormality. Chronic changes. Echocardiogram: SUMMARY:  -  Left ventricle: Systolic function was mildly reduced. Ejection fraction   Was estimated in the range of 40 % to 45 %. There was mild diffuse hypokinesis   With regional variations  -  Atrial septum: Negative bubble 2/2/14. -  Aortic valve: The valve was trileaflet. Leaflets exhibited moderately  reduced cuspal separation. There was moderate regurgitation. 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 Carotid duplex w/o significant stenosis. Echocardiogram with EF:40-45%. Started on ASa/Lipitor. PT/OT/SP  - UTI - on IV Rocephin/Vancomycin - urine culture with [facility] - results pending  - EWA - improving on IV fluids. Check US renal to r/o hydronephrosis  - Advanced Parkinson disease - continue Sinemet . Planned for d/c to [facility] with hospice  - acute systolic CHF -  Will add Lisinopril. Will consult cardiology - appreciate the help. DVT Prophylaxis: Heparin SQ  CODE Status: DNR  Plan of Care Discussed with:patient.  Care team   Medical Risk: moderate  Disposition: pending    Marcelino Rodriguez MD  03/21/17

## 2017-03-22 PROBLEM — I63.511 CEREBROVASCULAR ACCIDENT (CVA) DUE TO OCCLUSION OF RIGHT MIDDLE CEREBRAL ARTERY (HCC): Status: ACTIVE | Noted: 2017-01-01

## 2017-03-22 PROBLEM — I61.9 CVA (CEREBROVASCULAR ACCIDENT DUE TO INTRACEREBRAL HEMORRHAGE) (HCC): Status: RESOLVED | Noted: 2017-01-01 | Resolved: 2017-01-01

## 2017-03-22 NOTE — PROGRESS NOTES
Care Management Interventions  PCP Verified by CM: Yes  Mode of Transport at Discharge: [address]  [address] Time of Discharge: 1200  Transition of Care Consult (CM Consult): Home Hospice, [address]: Yes  Discharge Durable Medical Equipment: No  Physical Therapy Consult: Yes  Occupational Therapy Consult: Yes  Current Support Network: Assisted Living  Confirm Follow Up Transport: Family  Plan discussed with Pt/Family/Caregiver: Yes  Freedom of Choice Offered: Yes  Discharge Location  Discharge Placement: Assisted Living    Pt will return to [facility] [address] today via [place] at 12 noon. [facility] will follow at the Assisted Living. D/C Summary included in packet - physician has initiated all medication orders on D/C summary as requested by accepting facility.

## 2017-03-22 NOTE — PROGRESS NOTES
Carlos ([employer]) at [employer] given report. Pt's IV removed and meds crushed and given in chocolate pudding. Pt's daughter at bedside and EMS here to get pt. [facility] aware that pt is on a pureed diet.

## 2017-03-22 NOTE — PROGRESS NOTES
Pt will return to [facility] with [facility] at discharge. DNR will need to be signed by physician prior to transport. SW will alert hospice when transport time is arranged for patient.

## 2017-03-22 NOTE — HOSPICE
[facility]-    Made aware that pt's transport will not be at 0900 but rather 1200. [facility] admitting RN to admit into hospice program after return ay[facility]. Arina Brar aware of the plan.     Thank you-  Oliver Park RN  [facility] liaison  [phone]

## 2017-03-22 NOTE — DISCHARGE SUMMARY
Hospitalist Discharge Summary     Admit Date:  3/18/2017  3:02 PM   Name:  Aaron Matthews   Age:  80 y.o.  :  1928   MRN:  [id number]   PCP:  Tyshawn Linares MD  Treatment Team: Attending Provider: Gita Toussaint DO; Utilization Review: Kareem Gomez RN; Care Manager: Ramin Reynolds; Utilization Review: Eliza Lorenzo RN    Problem List for this Hospitalization:  Hospital Problems as of 3/22/2017  Date Reviewed: 2016          Codes Class Noted - Resolved POA    * (Principal)Cerebrovascular accident (CVA) due to occlusion of right middle cerebral artery ([facility] ICD-10-CM: I63.511  ICD-9-CM: 434.91  3/20/2017 - Present Yes        Weakness due to cerebrovascular accident (CVA) ([facility] ICD-10-CM: I63.9, R53.1  ICD-9-CM: 434.91, 780.79  3/18/2017 - Present Yes        UTI (urinary tract infection) ICD-10-CM: N39.0  ICD-9-CM: 599.0  2016 - Present Yes        Expressive speech disorder ICD-10-CM: F80.1  ICD-9-CM: 315.31  2016 - Present Yes        Parkinson disease ([facility] (Chronic) ICD-10-CM: G20  ICD-9-CM: 332.0  Unknown - Present Yes        BPH (benign prostatic hyperplasia) (Chronic) ICD-10-CM: N40.0  ICD-9-CM: 600.00  2014 - Present Yes                Admission HPI from 3/18/2017:    \" Patient 89M with pmhx of BPH, Parkinson, expressive aphasia presenting from the \"\" SNF with report of acute onset of right sided facial droop, left gaze deviation and right sided weakness beginning at approx 1 pm day of arrival. Family at bedside delivers hx d/t pt condition. States similar symptoms actually occurred the first time 4 days prior (tuesday night) with right sided weakness and facial droop and seem to have been improved the following morning. Occurred again today and facility requested transport to hospital. Symptoms have been improving during hospital course and at time of my interview his right sided facial droop was only remaining symptoms.  At baseline pt has expressive aphasia but full motor strength. Can often communicate with \"thumbs up/down\". Hospitalist asked to admit for CVA/TIA eval. \"    Hospital Course:  MRI brain confirms right basal ganglia infarct. Carotid duplex w/o significant stenosis. Echocardiogram with EF:40-45%. See other findings below. Code status changed to DNR per daughter wishes . Seen by hospice with plan to d/c to intermediate with hospice. This was arranged today. Follow up instructions below. Plan was discussed with pt and family. All questions answered. Patient was stable at time of discharge and was instructed to call or return if there are any concerns or recurrence of symptoms. Diagnostic Imaging/Tests:   US RETROPERITONEUM COMP (Final result) Result time: 03/22/17 10:30:00     Final result     Impression:     Impression: No acute hydronephrosis.  Small solid-appearing right renal lower  pole cortical mass.  Mild distal abdominal aortic aneurysm.  Trabeculated  urinary bladder wall.  If further clinical concern, elective outpatient CT of  the abdomen and pelvis hematuria protocol with and without contrast may be  helpful in further evaluation.     Narrative:     Bilateral renal ultrasound    History: Renal failure, acute (kidney injury); Urinary tract infection, 80 years  Male    Comparison: None available    Findings: No evidence of hydronephrosis, nephrolithiasis.  The right kidney  measures 10.7 CM in length.  There appears to be a hypoechoic solid mass arising  from the right renal lower pole cortex measuring 2.1 x 2.7 x 2.2 cm, without  flow.  Left kidney measures  10.4 CM.  Partially full urinary bladder  demonstrates mild irregularity and trabeculation of the bladder wall,  nonspecific.  Incidental note of distal abdominal aortic aneurysm measuring 3.2  x 3.3 cm with mural thrombus.                   MRI BRAIN WO CONT (Final result) Result time: 03/19/17 14:06:16     Final result     Impression:     IMPRESSION: Acute/subacute small right basal ganglia infarct. No acute  hemorrhage. Generalized atrophy and chronic small vessel disease.       Narrative:     MRI BRAIN WITHOUT CONTRAST. HISTORY: Right-sided facial droop expressive achalasia and left-sided gaze with  right-sided weakness. COMPARISON:  02 February 2014. TECHNIQUE:  Sagittal T1, axial T1, T2, FLAIR, gradient echo, diffusion with ADC  map and coronal FLAIR.      FINDINGS: Focal area of restricted diffusion left basal ganglia and probably  external capsule. There is decreased ADC map signal. This represents a change  from the prior exam.  Moderate periventricular and centrum semiovale FLAIR and  T2 white matter hyperintensities are present; these are nonspecific and may  represent chronic small vessel disease. No midline shift, mass or mass effect. Gradient echo images are unremarkable.  No evidence of acute hemorrhage.  The  lateral ventricles are normal size. There is generalized atrophy.  The  pituitary, parasellar and midline structures are unremarkable on the sagittal T1  images.  There are normal T2 flow-voids in the major vessels.   Posterior fossa  structures are unremarkable.  Orbits are grossly normal.  Paranasal sinuses are  clear.                 DUPLEX CAROTID BILATERAL (Final result) Result time: 03/18/17 19:12:02     Final result     Impression:     IMPRESSION:    PREETI:  No hemodynamically significant stenosis. LICA:  No hemodynamically significant stenosis. Tortuous PREETI consistent with long-standing hypertension. Relatively modest  atherosclerotic disease in the bilateral carotid arteries.     Narrative:     TITLE:  Carotid and Vertebral Ultrasound Examination. INDICATION:  Cerebrovascular accident. Expressive aphasia and acute onset  right-sided facial droop, left gaze deviation, right-sided weakness.     TECHNIQUE: Grayscale, color, and Doppler interrogation performed.  All velocity  measurements are made in relation to the distal internal carotid artery.  The  degree of stenosis is inferred from velocity parameters and cross referenced to  published correlations.  Velocity criteria are extrapolated from diameter data  as defined in the [address] [place] [address] Road symptomatic carotid endarterectomy trial  (NASCET). COMPARISON: None. RIGHT NECK:  The peak systolic velocity in the Common Carotid Artery = 90  cm/sec; Internal Carotid Artery = 51 cm/sec.  Ratio = 0.6.      Intimal thickening throughout the CCA/carotid bulb. Sessile echogenic plaque in  the carotid bulb.  Tortuous PREETI. No waveform broadening.  Anterograde flow in  the vertebral artery. LEFT  NECK:  The peak systolic velocity in the Common Carotid Artery = 88  cm/sec; Internal Carotid Artery = 73 cm/sec. Ratio = 0.8.      Intimal thickening throughout the CCA/carotid bulb. Small echogenic plaque in  the carotid bulb.  No waveform broadening.  Anterograde flow in the vertebral  artery.                 CT HEAD WO CONT (Final result) Result time: 03/18/17 15:30:58     Final result     Impression:     IMPRESSION:  Negative for acute intracranial abnormality.  Chronic changes.         Narrative:     CT HEAD WITHOUT CONTRAST. INDICATION: Acute right-sided weakness. CVA suspected. Codes stroke. COMPARISON: 23 April 2016.      TECHNIQUE:   5 mm axial scans from the skull base to the vertex.  Our CT  scanners use one or more of the following:  Automated exposure control,  adjustment of the mA and or kV according to patient size, iterative  reconstruction. FINDINGS:  No acute intraparenchymal hemorrhage or abnormal extra-axial fluid  collection.  The ventricles are normal size. Moderate white matter low  attenuation is present, nonspecific, likely chronic small vessel disease.  No  midline shift or mass effect. Generalized atrophy. Included portion of the  paranasal sinuses and orbits grossly unremarkable.          All Micro Results     Procedure Component Value Units Date/Time    CULTURE, URINE [[id number]]  (Abnormal) Collected:  03/19/17 0600    Order Status:  Completed Specimen:  Urine from Clean catch Updated:  03/22/17 0848     Special Requests: NO SPECIAL REQUESTS        Culture result:       >100,000  COLONIES/mL  ENTEROCOCCUS SPECIES  REPEATING ID AND SUSCEPTIBILITY   (A)            10,000 to 50,000  COLONIES/mL  NORMAL SKIN BARTOLOME ISOLATED      MRSA SCREEN - PCR (NASAL) [[id number]]  (Abnormal) Collected:  03/20/17 1007    Order Status:  Completed Specimen:  Nasal from Nasal Updated:  03/20/17 1311     Special Requests: NO SPECIAL REQUESTS        Culture result:         MRSA target DNA detected, SA target DNA detected. A positive test result does not necessarily indicate the presence of viable organisms. It is however, presumptive for the presence of MRSA or SA. (A)            RESULTS VERIFIED, PHONED TO AND READ BACK BY  PATI SMALL ON 03/20/17 @1310, ADS            Labs: Results:       BMP, Mg, Phos Recent Labs      03/22/17   0600  03/20/17   0637   NA  143  139   K  3.4*  3.8   CL  107  105   CO2  31  29   AGAP  5*  5*   BUN  17  20   CREA  1.06  1.11   CA  8.8  9.0   GLU  86  84      CBC Recent Labs      03/22/17   0600  03/20/17   0637   WBC  7.0  5.6   RBC  4.35  4.27   HGB  12.2*  11.8*   HCT  37.7*  37.3*   PLT  249  248      LFT No results for input(s): SGOT, ALT, TBIL, AP, TP, ALB, GLOB, AGRAT, GPT in the last 72 hours.    Cardiac Testing Lab Results   Component Value Date/Time    BNP 53 04/23/2016 07:30 PM     09/09/2015 04:31 AM    CK 1040 09/08/2015 04:50 AM    CK 2295 09/07/2015 05:40 AM    CK - MB 16.4 09/05/2015 05:00 PM    CK-MB Index 1.5 09/05/2015 05:00 PM    Troponin-I, Qt. <0.02 04/23/2016 07:30 PM      Coagulation Tests Lab Results   Component Value Date/Time    Prothrombin time 12.1 03/18/2017 03:38 PM    INR 1.1 03/18/2017 03:38 PM    INR (POC) 1.1 03/18/2017 03:31 PM      A1c Lab Results   Component Value Date/Time    Hemoglobin A1c 6.0 03/19/2017 05:25 AM Lipid Panel Lab Results   Component Value Date/Time    Cholesterol, total 152 03/19/2017 05:25 AM    HDL Cholesterol 58 03/19/2017 05:25 AM    LDL, calculated 83.4 03/19/2017 05:25 AM    VLDL, calculated 10.6 03/19/2017 05:25 AM    Triglyceride 53 03/19/2017 05:25 AM    CHOL/HDL Ratio 2.6 03/19/2017 05:25 AM      Thyroid Panel Lab Results   Component Value Date/Time    TSH 2.370 05/26/2016 11:04 AM        Most Recent UA Lab Results   Component Value Date/Time    Color YELLOW 03/19/2017 06:00 AM    Appearance HAZY 03/19/2017 06:00 AM    Specific gravity 1.012 09/06/2015 08:15 PM    pH (UA) 6.0 03/19/2017 06:00 AM    Protein NEGATIVE  03/19/2017 06:00 AM    Glucose NEGATIVE  03/19/2017 06:00 AM    Ketone TRACE 03/19/2017 06:00 AM    Bilirubin NEGATIVE  03/19/2017 06:00 AM    Blood NEGATIVE  03/19/2017 06:00 AM    Urobilinogen 0.2 03/19/2017 06:00 AM    Nitrites NEGATIVE  03/19/2017 06:00 AM    Leukocyte Esterase SMALL 03/19/2017 06:00 AM        No Known Allergies  Immunization History   Administered Date(s) Administered    Pneumococcal Conjugate (PCV-13) 06/29/2015    TB Skin Test (PPD) Intradermal 09/06/2015, 05/26/2016, 06/28/2016, 03/18/2017       All Labs from Last 24 Hrs:  Recent Results (from the past 24 hour(s))   GLUCOSE, POC    Collection Time: 03/21/17 11:23 AM   Result Value Ref Range    Glucose (POC) 93 65 - 100 mg/dL   GLUCOSE, POC    Collection Time: 03/21/17  3:46 PM   Result Value Ref Range    Glucose (POC) 121 (H) 65 - 100 mg/dL   GLUCOSE, POC    Collection Time: 03/21/17  7:52 PM   Result Value Ref Range    Glucose (POC) 134 (H) 65 - 100 mg/dL   CBC W/O DIFF    Collection Time: 03/22/17  6:00 AM   Result Value Ref Range    WBC 7.0 4.3 - 11.1 K/uL    RBC 4.35 4.23 - 5.67 M/uL    HGB 12.2 (L) 13.6 - 17.2 g/dL    HCT 37.7 (L) 41.1 - 50.3 %    MCV 86.7 79.6 - 97.8 FL    MCH 28.0 26.1 - 32.9 PG    MCHC 32.4 31.4 - 35.0 g/dL    RDW 15.4 (H) 11.9 - 14.6 %    PLATELET 262 690 - 689 K/uL    MPV 10.0 (L) 10.8 - 51.0 FL   METABOLIC PANEL, BASIC    Collection Time: 03/22/17  6:00 AM   Result Value Ref Range    Sodium 143 136 - 145 mmol/L    Potassium 3.4 (L) 3.5 - 5.1 mmol/L    Chloride 107 98 - 107 mmol/L    CO2 31 21 - 32 mmol/L    Anion gap 5 (L) 7 - 16 mmol/L    Glucose 86 65 - 100 mg/dL    BUN 17 8 - 23 MG/DL    Creatinine 1.06 0.8 - 1.5 MG/DL    GFR est AA >60 >60 ml/min/1.73m2    GFR est non-AA >60 >60 ml/min/1.73m2    Calcium 8.8 8.3 - 10.4 MG/DL   GLUCOSE, POC    Collection Time: 03/22/17  7:02 AM   Result Value Ref Range    Glucose (POC) 83 65 - 100 mg/dL       Discharge Exam:  Patient Vitals for the past 24 hrs:   Temp Pulse Resp BP SpO2   03/22/17 0658 97.3 °F (36.3 °C) 86 18 (!) 158/99 94 %   03/22/17 0510 97.2 °F (36.2 °C) 90 18 154/87 96 %   03/21/17 2330 97.8 °F (36.6 °C) 78 18 122/75 96 %   03/21/17 1913 97.8 °F (36.6 °C) (!) 105 17 113/66 97 %   03/21/17 1415 - (!) 103 18 147/77 95 %   03/21/17 1118 97.8 °F (36.6 °C) 67 18 137/69 94 %     Oxygen Therapy  O2 Sat (%): 94 % (03/22/17 0658)  Pulse via Oximetry: 110 beats per minute (03/18/17 1715)  O2 Device: Room air (03/21/17 1403)  O2 Flow Rate (L/min): 1 l/min (03/20/17 1146)    Intake/Output Summary (Last 24 hours) at 03/22/17 1038  Last data filed at 03/22/17 0514   Gross per 24 hour   Intake             2971 ml   Output                0 ml   Net             2971 ml       General:    Well nourished. Alert. No distress. Eyes:   Normal sclera. Extraocular movements intact. ENT:  Normocephalic, atraumatic. Moist mucous membranes  CV:   Regular rate and rhythm. No murmur, rub, or gallop. Lungs:  Clear to auscultation bilaterally. No wheezing, rhonchi, or rales. Abdomen: Soft, nontender, nondistended. Bowel sounds normal.   Extremities: Warm and dry. No cyanosis or edema. Neurologic: CN II-XII grossly intact. Sensation intact. Skin:     No rashes or jaundice. No wounds. Psych:  Normal mood and affect.     Discharge Info:   Current Discharge Medication List      START taking these medications    Details   divalproex (DEPAKOTE SPRINKLE) 125 mg capsule Take 1 Cap by mouth three (3) times daily as needed (agitation). Qty: 90 Cap, Refills: 2         CONTINUE these medications which have NOT CHANGED    Details   carbidopa-levodopa (SINEMET)  mg per tablet Take 1.5 Tabs by mouth four (4) times daily. Qty: 180 Tab, Refills: 12    Associated Diagnoses: Parkinson disease ([address]      finasteride (PROSCAR) 5 mg tablet Take 1 Tab by mouth daily. Qty: 30 Tab, Refills: 12    Associated Diagnoses: Benign prostatic hyperplasia, presence of lower urinary tract symptoms unspecified, unspecified morphology      alfuzosin SR (UROXATRAL) 10 mg SR tablet Take 1 Tab by mouth daily. Qty: 30 Tab, Refills: 12    Associated Diagnoses: Benign prostatic hyperplasia, presence of lower urinary tract symptoms unspecified, unspecified morphology         STOP taking these medications       furosemide (LASIX) 20 mg tablet Comments:   Reason for Stopping:         cholecalciferol, vitamin D3, 2,000 unit tab Comments:   Reason for Stopping:                 Disposition: Hospice  Activity: Activity as tolerated  Diet: Regular Diet, comfort feeds    Follow-up Information     Follow up With Details Comments Contact Info    Josué Scott MD Call As needed [facility] [address] [place] [id number]  Maury Topete MD Today hospice [address]  [address]  [phone]              Time spent in patient discharge planning and coordination 35 minutes.     Signed:  Lena Zarate MD

## 2017-03-23 NOTE — PROGRESS NOTES
Per [facility] patient discharged JOHN/Home with [facility] 03/22/2017, No [facility]ENJY Outreach This note will not be viewable in [facility].

## 2017-06-30 ENCOUNTER — HOME CARE VISIT (OUTPATIENT)
Dept: HOSPICE | Facility: HOSPICE | Age: 82
End: 2017-06-30
Payer: MEDICARE

## 2017-07-05 ENCOUNTER — DOCUMENTATION ONLY (OUTPATIENT)
Dept: OTHER | Age: 82
End: 2017-07-05

## 2017-07-05 NOTE — PROGRESS NOTES
Jessica Cancino documented visits to the patient three times in the month of June. 6-15-17  Pascale Leonardo provided an hour of companionship care. 4-62-74 Pascale Leonardo provided two hours of companionship care. 6-25-17  Pascale Leonardo provided two hours of companionship care.